# Patient Record
Sex: FEMALE | Race: WHITE | NOT HISPANIC OR LATINO | Employment: FULL TIME | URBAN - METROPOLITAN AREA
[De-identification: names, ages, dates, MRNs, and addresses within clinical notes are randomized per-mention and may not be internally consistent; named-entity substitution may affect disease eponyms.]

---

## 2018-09-06 ENCOUNTER — OFFICE VISIT - HEALTHEAST (OUTPATIENT)
Dept: FAMILY MEDICINE | Facility: CLINIC | Age: 28
End: 2018-09-06

## 2018-09-06 DIAGNOSIS — R50.9 FEVER: ICD-10-CM

## 2018-09-06 DIAGNOSIS — J02.9 PHARYNGITIS: ICD-10-CM

## 2018-09-06 LAB — DEPRECATED S PYO AG THROAT QL EIA: NORMAL

## 2018-09-07 LAB — GROUP A STREP BY PCR: NORMAL

## 2019-06-22 ENCOUNTER — OFFICE VISIT - HEALTHEAST (OUTPATIENT)
Dept: FAMILY MEDICINE | Facility: CLINIC | Age: 29
End: 2019-06-22

## 2019-06-22 DIAGNOSIS — J02.9 SORE THROAT: ICD-10-CM

## 2019-06-22 LAB — DEPRECATED S PYO AG THROAT QL EIA: NORMAL

## 2019-06-23 LAB — GROUP A STREP BY PCR: NORMAL

## 2020-12-23 ENCOUNTER — TELEPHONE (OUTPATIENT)
Dept: SLEEP MEDICINE | Facility: CLINIC | Age: 30
End: 2020-12-23

## 2020-12-23 NOTE — TELEPHONE ENCOUNTER
REFERRAL TO SLEEP RECEIVED.   ATTEMPTED TO REACH PATIENT TODAY 12/23/2020 TO SCHEDULE CONSULTATION WITH SLEEP PROVIDER. NO ANSWER. [facility]

## 2020-12-29 ENCOUNTER — VIRTUAL VISIT (OUTPATIENT)
Dept: SLEEP MEDICINE | Facility: CLINIC | Age: 30
End: 2020-12-29
Payer: COMMERCIAL

## 2020-12-29 DIAGNOSIS — R06.83 SNORING: ICD-10-CM

## 2020-12-29 DIAGNOSIS — G47.59 OTHER PARASOMNIA: ICD-10-CM

## 2020-12-29 DIAGNOSIS — G47.10 HYPERSOMNIA: ICD-10-CM

## 2020-12-29 DIAGNOSIS — R06.81 WITNESSED EPISODE OF APNEA: Primary | ICD-10-CM

## 2020-12-29 PROCEDURE — 99203 OFFICE O/P NEW LOW 30 MIN: CPT | Mod: GT | Performed by: INTERNAL MEDICINE

## 2020-12-29 RX ORDER — PAROXETINE 30 MG/1
TABLET, FILM COATED ORAL EVERY MORNING
COMMUNITY
End: 2022-09-07

## 2020-12-29 NOTE — NURSING NOTE
PSG and follow up scheduled. ESS and SALOME sent via Before the Call     Ewelina Delgado [facility] ~[place]
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
## 2020-12-29 NOTE — PATIENT INSTRUCTIONS
Your BMI is There is no height or weight on file to calculate BMI.  Weight management is a personal decision.  If you are interested in exploring weight loss strategies, the following discussion covers the approaches that may be successful. Body mass index (BMI) is one way to tell whether you are at a healthy weight, overweight, or obese. It measures your weight in relation to your height.  A BMI of 18.5 to 24.9 is in the healthy range. A person with a BMI of 25 to 29.9 is considered overweight, and someone with a BMI of 30 or greater is considered obese. More than two-thirds of American adults are considered overweight or obese.  Being overweight or obese increases the risk for further weight gain. Excess weight may lead to heart disease and diabetes.  Creating and following plans for healthy eating and physical activity may help you improve your health.  Weight control is part of healthy lifestyle and includes exercise, emotional health, and healthy eating habits. Careful eating habits lifelong are the mainstay of weight control. Though there are significant health benefits from weight loss, long-term weight loss with diet alone may be very difficult to achieve- studies show long-term success with dietary management in less than 10% of people. Attaining a healthy weight may be especially difficult to achieve in those with severe obesity. In some cases, medications, devices and surgical management might be considered.  What can you do?  If you are overweight or obese and are interested in methods for weight loss, you should discuss this with your provider.     Consider reducing daily calorie intake by 500 calories.     Keep a food journal.     Avoiding skipping meals, consider cutting portions instead.    Diet combined with exercise helps maintain muscle while optimizing fat loss. Strength training is particularly important for building and maintaining muscle mass. Exercise helps reduce stress, increase energy,  and improves fitness. Increasing exercise without diet control, however, may not burn enough calories to loose weight.       Start walking three days a week 10-20 minutes at a time    Work towards walking thirty minutes five days a week     Eventually, increase the speed of your walking for 1-2 minutes at time    In addition, we recommend that you review healthy lifestyles and methods for weight loss available through the National Institutes of Health patient information sites:  http://win.niddk.nih.gov/publications/index.htm    And look into health and wellness programs that may be available through your health insurance provider, employer, local community center, or nedra club.    Weight management plan: Patient was referred to their PCP to discuss a diet and exercise plan.  Patient education: What is a sleep study?     What is a sleep study? -- A sleep study is a test that measures how well you sleep and checks for sleep problems. For some sleep studies, you stay overnight in a sleep lab at a hospital or sleep center.     What happens during a sleep study? -- Before you go to sleep, a technician attaches small, sticky patches called  electrodes  to your head, chest, and legs. He or she will also place a small tube beneath your nose and might wrap 1 or 2 belts around your chest.   Each of these items has wires that connect to monitors. The monitors record your movement, brain activity, breathing, and other body functions while you sleep.  If you have a history of trouble falling asleep, your doctor might prescribe a medicine to help you fall asleep in the lab. If you have never taken the medicine before, your doctor might ask you take it on a night before your sleep study to see how it affects you.   Why might my doctor order a sleep study? -- Your doctor will order a sleep study if he or she thinks you have sleep apnea or a different condition that makes you:   ?Have sudden jerking leg movements while you sleep,  called  periodic limb movements.    ?Feel very sleepy during the day and fall asleep all of a sudden, called  narcolepsy.    ?Have trouble falling asleep or staying asleep over a long period of time, called  chronic insomnia.    ?Do odd things while you sleep, such as walking.  How should I prepare for a sleep study? -- On the day of your sleep study, you should:   ?Avoid alcohol   ?Avoid drinking coffee, tea, sodas, and other drinks that have caffeine in the afternoon and evening   ?Take all of your regular medicines     The cost of care estimate line is [phone]. They are able to give the patient an estimate of the charges and also an estimate of their insurance coverage/patient responsibility.   After your sleep study is performed, please call us at [phone] or [phone]  to schedule for a follow up to review the results of the sleep study.    Please bring one tab of low dose melatonin 3 mg or less to the night of the study.    Melatonin intake is completely voluntary.    You may take own melatonin after arrival to sleep center. Do not drive or operate machinery after intake of melatonin.

## 2021-01-15 ENCOUNTER — THERAPY VISIT (OUTPATIENT)
Dept: SLEEP MEDICINE | Facility: CLINIC | Age: 31
End: 2021-01-15
Payer: COMMERCIAL

## 2021-01-15 ENCOUNTER — HEALTH MAINTENANCE LETTER (OUTPATIENT)
Age: 31
End: 2021-01-15

## 2021-01-15 DIAGNOSIS — G47.10 HYPERSOMNIA: ICD-10-CM

## 2021-01-15 DIAGNOSIS — R06.81 WITNESSED EPISODE OF APNEA: ICD-10-CM

## 2021-01-15 DIAGNOSIS — R06.83 SNORING: ICD-10-CM

## 2021-01-15 DIAGNOSIS — G47.59 OTHER PARASOMNIA: ICD-10-CM

## 2021-01-15 PROCEDURE — 95810 POLYSOM 6/> YRS 4/> PARAM: CPT | Performed by: INTERNAL MEDICINE

## 2021-01-19 LAB — SLPCOMP: NORMAL

## 2021-01-29 ENCOUNTER — VIRTUAL VISIT (OUTPATIENT)
Dept: SLEEP MEDICINE | Facility: CLINIC | Age: 31
End: 2021-01-29
Payer: COMMERCIAL

## 2021-01-29 VITALS — BODY MASS INDEX: 34.36 KG/M2 | WEIGHT: 175 LBS | HEIGHT: 60 IN

## 2021-01-29 DIAGNOSIS — G47.61 PERIODIC LIMB MOVEMENT: ICD-10-CM

## 2021-01-29 DIAGNOSIS — G47.8 UPPER AIRWAY RESISTANCE SYNDROME: ICD-10-CM

## 2021-01-29 DIAGNOSIS — G47.59 OTHER PARASOMNIA: Primary | ICD-10-CM

## 2021-01-29 PROCEDURE — 99212 OFFICE O/P EST SF 10 MIN: CPT | Mod: TEL | Performed by: INTERNAL MEDICINE

## 2021-01-29 ASSESSMENT — MIFFLIN-ST. JEOR: SCORE: 1445.29

## 2021-01-29 NOTE — PATIENT INSTRUCTIONS
Your BMI is Body mass index is 34.18 kg/m .  Weight management is a personal decision.  If you are interested in exploring weight loss strategies, the following discussion covers the approaches that may be successful. Body mass index (BMI) is one way to tell whether you are at a healthy weight, overweight, or obese. It measures your weight in relation to your height.  A BMI of 18.5 to 24.9 is in the healthy range. A person with a BMI of 25 to 29.9 is considered overweight, and someone with a BMI of 30 or greater is considered obese. More than two-thirds of American adults are considered overweight or obese.  Being overweight or obese increases the risk for further weight gain. Excess weight may lead to heart disease and diabetes.  Creating and following plans for healthy eating and physical activity may help you improve your health.  Weight control is part of healthy lifestyle and includes exercise, emotional health, and healthy eating habits. Careful eating habits lifelong are the mainstay of weight control. Though there are significant health benefits from weight loss, long-term weight loss with diet alone may be very difficult to achieve- studies show long-term success with dietary management in less than 10% of people. Attaining a healthy weight may be especially difficult to achieve in those with severe obesity. In some cases, medications, devices and surgical management might be considered.  What can you do?  If you are overweight or obese and are interested in methods for weight loss, you should discuss this with your provider.     Consider reducing daily calorie intake by 500 calories.     Keep a food journal.     Avoiding skipping meals, consider cutting portions instead.    Diet combined with exercise helps maintain muscle while optimizing fat loss. Strength training is particularly important for building and maintaining muscle mass. Exercise helps reduce stress, increase energy, and improves fitness.  Increasing exercise without diet control, however, may not burn enough calories to loose weight.       Start walking three days a week 10-20 minutes at a time    Work towards walking thirty minutes five days a week     Eventually, increase the speed of your walking for 1-2 minutes at time    In addition, we recommend that you review healthy lifestyles and methods for weight loss available through the National Institutes of Health patient information sites:  http://win.niddk.nih.gov/publications/index.htm    And look into health and wellness programs that may be available through your health insurance provider, employer, local community center, or nedra club.    Weight management plan: Patient was referred to their PCP to discuss a diet and exercise plan.  Upper Airway Resistance Syndrome treatment options:    1. Positional therapy- consider getting bricks to put underneath the head of your bed to turn your bed into a wedge. Your aim is to elevated the plane of your bed so you are sleeping with the head of your bed elevated.  2. Muscle strengthening exercises- Consider learning to play a lower octave woodwind instrument such as tuba, saxophone or trumpet for 30 minutes a day for 3 months.  3.Weight loss  4.Mandibular advancement device  5.CPAP    Please call  to evaluate for Epilepsy.    Jam Martin MD, PhD  Epileptologist, Neurologist   Specialties and Services  Neurology     Neurology Clinic   [facility]    Floor 3  [address] [place] [address]   Appointments:   [phone]     [facility]    Floor 2, [address]  [address]  [address]  [place] [address]   Appointments:   [phone]        Statement Selected

## 2021-01-29 NOTE — PROGRESS NOTES
"Lina is a 30 year old who is being evaluated via a billable video visit.      How would you like to obtain your AVS? Mail a copy  If the video visit is dropped, the invitation should be resent by: Text to cell phone: [phone]  Will anyone else be joining your video visit? No   Emili Nova, CMA    The patient has been notified of following:      \"This telephone visit will be conducted via a call between you and your physician/provider. We have found that certain health care needs can be provided without the need for a physical exam.  This service lets us provide the care you need with a short phone conversation.  If a prescription is necessary we can send it directly to your pharmacy.  If lab work is needed we can place an order for that and you can then stop by our lab to have the test done at a later time.     Telephone visits are billed at different rates depending on your insurance coverage. During this emergency period, for some insurers they may be billed the same as an in-person visit.  Please reach out to your insurance provider with any questions.     If during the course of the call the physician/provider feels a telephone visit is not appropriate, you will not be charged for this service.\"     Patient has given verbal consent to a Telephone visit? Yes     I spent a total of 21 minutes of  phone encounter and in preparation for this clinic visit.    Video was not available for this visit.    Thank you for the opportunity to participate in the care of Lina Turcios.     She is a 30 year old  female patient who comes to the sleep medicine clinic for review of sleep study results. The study was completed on 01/15/21  which showed that the patient had Upper Airway Resistance syndrome, Periodic Limb movement in sleep and possible eliptiform activities on EEG. There was insufficient amount of stage REM sleep to rule out RBD.    Assessment and Plan:  In summary Lina Turcios is a 30 year old year " old female here for review of sleep study results.    1. Other parasomnia  Due to the fact that there was some possible eliptiform activities noted on EEG, I will refer the patient to be evaluated by an Epileptologist. I recommend that the patient continue to safe proof her sleeping environment and consider sleeping with the sleeping bag zipped up.  - NEUROLOGY ADULT REFERRAL    2. Upper airway resistance syndrome  We discussed the underlying pathophysiology of UARS. We also discussed the treatment options available including positional therapy, weight loss, muscle strengthening exercises, oral appliance and CPAP.    3. Periodic limb movement  Most likely will resolve after optimal therapy of UARS.    There is no problem list on file for this patient.      Current Outpatient Medications   Medication Sig Dispense Refill     PARoxetine (PAXIL) 30 MG tablet Take by mouth every morning         Labs/Studies:  - We reviewed the results of the overnight study as described on the HPI.       Patient verbalized understanding of these issues, agrees with the plan and all questions were answered today. Patient was given an opportuntity to voice any other symptoms or concerns not listed above. Patient did not have any other symptoms or concerns.      Carlos Kwan DO  Board Certified in Internal Medicine and Sleep Medicine    (Note created with Dragon voice recognition and unintended spelling errors and word substitutions may occur)

## 2021-02-08 ENCOUNTER — TELEPHONE (OUTPATIENT)
Dept: SLEEP MEDICINE | Facility: CLINIC | Age: 31
End: 2021-02-08

## 2021-02-08 DIAGNOSIS — G47.10 HYPERSOMNIA: ICD-10-CM

## 2021-02-08 DIAGNOSIS — G47.8 UPPER AIRWAY RESISTANCE SYNDROME: Primary | ICD-10-CM

## 2021-02-08 NOTE — TELEPHONE ENCOUNTER
Reason for call:  Other   Patient called regarding (reason for call): pt talked with insurance and they will cover cpap machine. Pt would like to get a cpap prescription and get the process started.    Additional comments: pt would like a callback regarding this matter please     Phone number to reach patient:  Cell number on file:    Telephone Information:   Mobile [phone]       Best Time:  Anytime     Can we leave a detailed message on this number?  YES    Travel screening: Not Applicable

## 2021-02-15 NOTE — TELEPHONE ENCOUNTER
Order for Durable Medical Equipment was processed and equipment ordered.     DME provider:  [facility]    Date Faxed: 2/15/2021    Ordering Provider: Carlos Kwan DO    PAP Order Type: NEW DEVICE ORDER    Fax Number: [phone]

## 2021-03-03 ENCOUNTER — DOCUMENTATION ONLY (OUTPATIENT)
Dept: SLEEP MEDICINE | Facility: CLINIC | Age: 31
End: 2021-03-03

## 2021-03-03 NOTE — PROGRESS NOTES
Patient was offered choice of vendor and chose [facility].  Patient Lina Turcios was set up at [place]  on March 3, 2021. Patient received a Resmed AirSense 10 Auto. Pressures were set at 5-15 cm H2O.   Patient s ramp is 4 cm H2O for Auto and FLEX/EPR is EPR, 2.  Patient received a New Respironics Mask name: Dreamwear Nasal mask size Standard, Fitpack, heated tubing and heated humidifier.  Patient does not need to meet compliance. Patient has a follow up not required with Dr. Kwan.    Tracy L Fahrenkamp

## 2021-03-08 ENCOUNTER — DOCUMENTATION ONLY (OUTPATIENT)
Dept: SLEEP MEDICINE | Facility: CLINIC | Age: 31
End: 2021-03-08
Payer: COMMERCIAL

## 2021-03-08 NOTE — PROGRESS NOTES
3 DAY STM VISIT    Diagnostic AHI: 3.9  PSG    Patient contacted for 3 day STM visit.    Confirmed with patient at time of call- N/A Patient is still interested in STM service     Message left for patient to return call.    Replacement device: No  STM ordered by provider: Yes     Device type: Auto-CPAP  PAP settings from order::  CPAP min 5 cm  H20       CPAP max 15 cm  H20  Mask type:    Nasal Mask     Device settings from machine      Min CPAP 5.0            Max CPAP 15.0          EPR level Setting: TWO      RESMED Soft response setting:  OFF  Assessment: Nightly usage over four hours.  Action plan: Patient to have 14 day STM visit. Patient has a follow up visit scheduled:   no.     Total time spent on accessing and  interpreting remote patient PAP therapy data  10 minutes  Total time spent counseling, coaching  and reviewing PAP therapy data with patient  1 minutes  [id number] no

## 2021-03-18 ENCOUNTER — DOCUMENTATION ONLY (OUTPATIENT)
Dept: SLEEP MEDICINE | Facility: CLINIC | Age: 31
End: 2021-03-18
Payer: COMMERCIAL

## 2021-03-18 NOTE — PROGRESS NOTES
14  DAY STM VISIT    Diagnostic AHI: 3.9  PSG    Unable to leave message mailbox is full.     Assessment: Pt meeting objective benchmarks.       Action plan: waiting for patient to return call.  and pt to have 30 day STM visit.      Device type: Auto-CPAP    PAP settings: CPAP min 5.0 cm  H20       CPAP max 15.0 cm  H20    95th% pressure 9.5 cm  H20        RESMED EPR level Setting: TWO    RESMED Soft response setting:  OFF    Mask type:  Nasal Mask    Objective measures: 14 day rolling measures      Compliance  100 %      Leak  11.28  lpm  last  upload      AHI 3.63   last  upload      Average number of minutes 469      Objective measure goal  Compliance   Goal >70%  Leak   Goal < 24 lpm  AHI  Goal < 5  Usage  Goal >240        Total time spent on accessing and interpreting remote patient PAP therapy data  10 minutes    Total time spent counseling, coaching  and reviewing PAP therapy data with patient  1 minutes    [id number]  [id number]  no (3 day STM)

## 2021-04-06 ENCOUNTER — DOCUMENTATION ONLY (OUTPATIENT)
Dept: SLEEP MEDICINE | Facility: CLINIC | Age: 31
End: 2021-04-06

## 2021-04-06 NOTE — PROGRESS NOTES
30 DAY STM VISIT    Diagnostic AHI: 3.9  PSG    Subjective measures:   Patient still very tired throughout the day.  Sometimes her mask comes off at night.  Patient to see a specialist for possible epilepsy going to schedule with Dr Tolentino for sleep follow up.      Assessment: Pt meeting objective benchmarks.  Patient failing following subjective benchmarks: not feeling benefit from therapy  Action plan: pt to have 6 month STM visit  Patient has not scheduled a follow up visit.   Device type: Auto-CPAP  PAP settings: CPAP min 5.0 cm  H20     CPAP max 15.0 cm  H20    95th% pressure 9.1 cm  H20      RESMED EPR level Setting: TWO    RESMED Soft response setting:  OFF  Mask type:  Nasal Mask  Objective measures: 14 day rolling measures      Compliance  85 %      Leak  16.88 lpm  last  upload      AHI 3.75   last  upload      Average number of minutes 375      Objective measure goal  Compliance   Goal >70%  Leak   Goal < 24 lpm  AHI  Goal < 5  Usage  Goal >240        Total time spent on accessing and interpreting remote patient PAP therapy data  10 minutes    Total time spent counseling, coaching  and reviewing PAP therapy data with patient  7 minutes     [id number] this call  [id number] no  at 3 or 14 day [facility]

## 2021-04-20 ENCOUNTER — COMMUNICATION - HEALTHEAST (OUTPATIENT)
Dept: SCHEDULING | Facility: CLINIC | Age: 31
End: 2021-04-20

## 2021-04-20 ENCOUNTER — TELEPHONE (OUTPATIENT)
Dept: SLEEP MEDICINE | Facility: CLINIC | Age: 31
End: 2021-04-20

## 2021-04-20 NOTE — TELEPHONE ENCOUNTER
Reason for Call:  Other call back    Detailed comments: Patient saw Dr. Kwan at [facility].  States was told that she would be referred to a Dr. Law and she doesn't have a telephone number or heard of anyone calling her?  States r/u Epilepsy.  Wondering if she can go to [facility]?  Please contact patient. Thank you.      Phone Number Patient can be reached at: Cell number on file:    Telephone Information:   Mobile [phone]       Best Time: anytime    Can we leave a detailed message on this number? YES    Call taken on 4/20/2021 at 1:37 PM by Angélica Gonzales

## 2021-04-23 NOTE — TELEPHONE ENCOUNTER
MICHELLE with Lina, stating to call [facility], left their phone number for her to call.  Also, explained I was sending her a [employer] message with her AVS from Dr. Kwan visit 1/2021, with the referral attached.

## 2021-05-03 ENCOUNTER — VIRTUAL VISIT (OUTPATIENT)
Dept: NEUROLOGY | Facility: CLINIC | Age: 31
End: 2021-05-03
Attending: INTERNAL MEDICINE
Payer: COMMERCIAL

## 2021-05-03 DIAGNOSIS — R40.4 NONSPECIFIC PAROXYSMAL SPELL: Primary | ICD-10-CM

## 2021-05-03 NOTE — LETTER
5/3/2021       RE: Lina Turcios  : 1990   MRN: [id number]      Dear Colleague,    Thank you for referring your patient, Lina Turcios, to the [facility]PI[facility] at [facility]. Please see a copy of my visit note below.    Lina is a 30 year old who is being evaluated via a billable video visit.      How would you like to obtain your AVS? MyChart  If the video visit is dropped, the invitation should be resent by: Send to e-mail at: [email or web address]  Will anyone else be joining your video visit? No      Video was switched to phone call due to technical issues.    [facility] History and Physical       Patient:  Lina Turcios  :  1990   Age:  30 year old   Today's Virtual Visit:  5/3/2021    Referring Provider:    Carlos Kwan DO  [address] [place] [address]      History of Present Illness:    Ms. Lina Turcios is a 30 yr old right-handed woman who is referred for evaluation of possible epileptiform discharges on sleep study.   She had a sleep study 1/15/2021 which showed frequent PLMs and possible epileptiform discharges.   For couple years, she has been feeling not well-rested and also she acts out in sleep. She has woken up and punching and kicking her . She woke up panicking at times. She is a restless sleeper and toss and turns in sleep.  That happens 5-6 times a year. When her  wakes him up, she is confused for a couple min as to what happened, why he woke her up. He never reported she gurgules or make strange noises. before using C-PAP, she used to snore or wake up snorting.  Since C-PAP therapy she hasn't had any kicking/punching episodes since, but she suddenly wakes up panicking and checking on the baby to make sure she is breathing.   At work, when he is tired, she stares off to space for 15 sec, and she doesn't realize she is doing it, but her coworker  "has noticed it and asked \"Lina, what are you doing?\" or \"Lina, you did it again\".  She typically sighs before she goes into one, she is not aware of it.  She has noticed this 2-3 times in a day when they worked together.  They last a few seconds. These have been noted in the past 3-4 weeks. Her  hasn't noted spacing out or other types of seizures.   Sometimes has an involuntary twitch in her ankle or foot, noticed more in her left side. She has also noted it in her left elbow.   Franck, her , contributes to the history: it sounds like a panic attack. She wakes up and is concern about one of the kids. If her  moves, she startles, she wakes up and panic. Another thing is she kicks or flails her arm, and he wakes up. When he wakes up, it's over, he thinks it's only once, not continuous. No gurgling sounds or strange noises. No tongue bite, or urinary incontinence. Her  still notes these while using C-PAP. He is a sound-sleeper, can't tell the frequency, may be every 3-4 weeks.    Epilepsy Risk Factors:  Patient has been adopted at age 12. Her mom was an alcoholic, doesn't have detailed childhood history. Her brother is Dx'ed with fetal alcohol syndrome.  Her daughter was Dx'ed with rolandic epilepsy. She is not aware of having febrile seizures, meningitis or significant head injuries.   Past Medical History: Anxiety and depression-taking Paxil and sees a therapist. Upper airway Resistance syndrome (UARS).   Other issues: She has been getting a sharp shooting pain in her forehead for a few seconds when she sneezes in the past couple weeks, not with couphing or heavy lifting or bending her head.   Past Medical History:   Diagnosis Date     Depression      Family History   Adopted: Yes      Social History     Socioeconomic History     Marital status:      Spouse name: Not on file     Number of children: Not on file     Years of education: Not on file     Highest education level: " Not on file   Occupational History     Not on file   Social Needs     Financial resource strain: Not on file     Food insecurity     Worry: Not on file     Inability: Not on file     Transportation needs     Medical: Not on file     Non-medical: Not on file   Tobacco Use     Smoking status: Former Smoker     Smokeless tobacco: Current User   Substance and Sexual Activity     Alcohol use: Not on file     Drug use: Not on file     Sexual activity: Not on file   Lifestyle     Physical activity     Days per week: Not on file     Minutes per session: Not on file     Stress: Not on file   Relationships     Social connections     Talks on phone: Not on file     Gets together: Not on file     Attends [employer] service: Not on file     Active member of club or organization: Not on file     Attends meetings of clubs or organizations: Not on file     Relationship status: Not on file     Intimate partner violence     Fear of current or ex partner: Not on file     Emotionally abused: Not on file     Physically abused: Not on file     Forced sexual activity: Not on file   Other Topics Concern     Parent/sibling w/ CABG, MI or angioplasty before 65F 55M? 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 , has 2 children. She didn't have special ed during school yrs. She has a 2 yr degree in associates art. She drinks 3 drinks a week, wapes nicotine daily, denies illicit drugs.    Driving:  Currently patient is: driving  Female:   Birth control method: Copper IUD, not certain about having more kids.     Current Outpatient Medications   Medication Sig Dispense Refill     PARoxetine (PAXIL) 30 MG tablet Take by mouth every morning         Assessment and Plan:    1. Nocturnal spells: Differential diagnosis include periodic limb movements versus REM sleep behavior disorder versus epilepsy.    2. Daytime spacing out  spells: Epilepsy versus excessive daytime drowsiness versus psychogenic nonepileptic spells.    Discussion: [place] regulations on seizures and driving were reviewed with the patient.  The patient clearly understands that she/he is prohibited from operating a motor vehicle within 3 months following any seizure (that will impair control of car) or other episode with sudden unconsciousness or inability to sit up, and that she/he is required to report this most recent seizure to the DMV within 30 days after the event.    Avoid any activities that might lead to self-injury or injury of others, within 3 months following any seizure with impaired awareness or impaired motor control such activities include but are not limited to operating power tools, operating firearms, climbing ladders/trees/exposure to heights from which he might fall, exposure to vessels with hot cooking oil or water, and swimming alone.    I discussed doing a 3T brain MRI with epilepsy protocol and a 3-hour video EEG.  If EEG was not revealing we will admit the patient to epilepsy monitoring unit for a diagnostic evaluation and capturing her spells.     Will follow up after these tests.    As described above, I talked with the patient for 44 minutes and during this time counseling was greater than 50% of the visit time.  I spent an additional 20 minutes on reviewing patient's sleep study and documentation. This note was created in part by the use of Dragon voice recognition system. Inadvertent grammatical errors and typographical errors may still exist.  Tiffany Corcoran MD

## 2021-05-03 NOTE — PROGRESS NOTES
"Lina is a 30 year old who is being evaluated via a billable video visit.      How would you like to obtain your AVS? MyChart  If the video visit is dropped, the invitation should be resent by: Send to e-mail at: [email or web address]  Will anyone else be joining your video visit? No      Video was switched to phone call due to technical issues.    [facility] Physical       Patient:  Lina Turcios  :  1990   Age:  30 year old   Today's Virtual Visit:  5/3/2021    Referring Provider:    Carlos Kwan, DO  [address]  [place] [address]      History of Present Illness:    Ms. Lina Turcios is a 30 yr old right-handed woman who is referred for evaluation of possible epileptiform discharges on sleep study.   She had a sleep study 1/15/2021 which showed frequent PLMs and possible epileptiform discharges.   For couple years, she has been feeling not well-rested and also she acts out in sleep. She has woken up and punching and kicking her . She woke up panicking at times. She is a restless sleeper and toss and turns in sleep.  That happens 5-6 times a year. When her  wakes him up, she is confused for a couple min as to what happened, why he woke her up. He never reported she gurgules or make strange noises. before using C-PAP, she used to snore or wake up snorting.  Since C-PAP therapy she hasn't had any kicking/punching episodes since, but she suddenly wakes up panicking and checking on the baby to make sure she is breathing.   At work, when he is tired, she stares off to space for 15 sec, and she doesn't realize she is doing it, but her coworker has noticed it and asked \"Lina, what are you doing?\" or \"Lina, you did it again\".  She typically sighs before she goes into one, she is not aware of it.  She has noticed this 2-3 times in a day when they worked together.  They last a few seconds. These have been noted in the past " 3-4 weeks. Her  hasn't noted spacing out or other types of seizures.   Sometimes has an involuntary twitch in her ankle or foot, noticed more in her left side. She has also noted it in her left elbow.   Franck, her , contributes to the history: it sounds like a panic attack. She wakes up and is concern about one of the kids. If her  moves, she startles, she wakes up and panic. Another thing is she kicks or flails her arm, and he wakes up. When he wakes up, it's over, he thinks it's only once, not continuous. No gurgling sounds or strange noises. No tongue bite, or urinary incontinence. Her  still notes these while using C-PAP. He is a sound-sleeper, can't tell the frequency, may be every 3-4 weeks.    Epilepsy Risk Factors:  Patient has been adopted at age 12. Her mom was an alcoholic, doesn't have detailed childhood history. Her brother is Dx'ed with fetal alcohol syndrome.  Her daughter was Dx'ed with rolandic epilepsy. She is not aware of having febrile seizures, meningitis or significant head injuries.   Past Medical History: Anxiety and depression-taking Paxil and sees a therapist. Upper airway Resistance syndrome (UARS).   Other issues: She has been getting a sharp shooting pain in her forehead for a few seconds when she sneezes in the past couple weeks, not with couphing or heavy lifting or bending her head.   Past Medical History:   Diagnosis Date     Depression      Family History   Adopted: Yes      Social History     Socioeconomic History     Marital status:      Spouse name: Not on file     Number of children: Not on file     Years of education: Not on file     Highest education level: Not on file   Occupational History     Not on file   Social Needs     Financial resource strain: Not on file     Food insecurity     Worry: Not on file     Inability: Not on file     Transportation needs     Medical: Not on file     Non-medical: Not on file   Tobacco Use     Smoking  status: Former Smoker     Smokeless tobacco: Current User   Substance and Sexual Activity     Alcohol use: Not on file     Drug use: Not on file     Sexual activity: Not on file   Lifestyle     Physical activity     Days per week: Not on file     Minutes per session: Not on file     Stress: Not on file   Relationships     Social connections     Talks on phone: Not on file     Gets together: Not on file     Attends Alevism service: Not on file     Active member of club or organization: Not on file     Attends meetings of clubs or organizations: Not on file     Relationship status: Not on file     Intimate partner violence     Fear of current or ex partner: Not on file     Emotionally abused: Not on file     Physically abused: Not on file     Forced sexual activity: Not on file   Other Topics Concern     Parent/sibling w/ CABG, MI or angioplasty before 65F 55M? 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 , has 2 children. She didn't have special ed during school yrs. She has a 2 yr degree in [employer] art. She drinks 3 drinks a week, wapes nicotine daily, denies illicit drugs.    Driving:  Currently patient is: driving  Female:   Birth control method: Copper IUD, not certain about having more kids.     Current Outpatient Medications   Medication Sig Dispense Refill     PARoxetine (PAXIL) 30 MG tablet Take by mouth every morning         Assessment and Plan:    1. Nocturnal spells: Differential diagnosis include periodic limb movements versus REM sleep behavior disorder versus epilepsy.    2. Daytime spacing out spells: Epilepsy versus excessive daytime drowsiness versus psychogenic nonepileptic spells.    Discussion: [place] regulations on seizures and driving were reviewed with the patient.  The patient clearly understands that she/he is prohibited from operating a motor vehicle within 3  months following any seizure (that will impair control of car) or other episode with sudden unconsciousness or inability to sit up, and that she/he is required to report this most recent seizure to the DMV within 30 days after the event.    Avoid any activities that might lead to self-injury or injury of others, within 3 months following any seizure with impaired awareness or impaired motor control such activities include but are not limited to operating power tools, operating firearms, climbing ladders/trees/exposure to heights from which he might fall, exposure to vessels with hot cooking oil or water, and swimming alone.    I discussed doing a 3T brain MRI with epilepsy protocol and a 3-hour video EEG.  If EEG was not revealing we will admit the patient to epilepsy monitoring unit for a diagnostic evaluation and capturing her spells.     Will follow up after these tests.            As described above, I talked with the patient for 44 minutes and during this time counseling was greater than 50% of the visit time.  I spent an additional 20 minutes on reviewing patient's sleep study and documentation. This note was created in part by the use of Dragon voice recognition system. Inadvertent grammatical errors and typographical errors may still exist.  Tiffany Corcoran MD

## 2021-06-01 VITALS — WEIGHT: 162 LBS | BODY MASS INDEX: 31.64 KG/M2

## 2021-06-03 VITALS — WEIGHT: 166.7 LBS | BODY MASS INDEX: 32.56 KG/M2

## 2021-06-16 NOTE — TELEPHONE ENCOUNTER
Scheduling phoned [facility] to look up a visit from sleep center for MD Law.  No visit in chart.  Scheduling states that they will notify MD Kwan to get information for patient on how to contact MD Law.  [facility] disconnected from call.    COVID 19 Nurse Triage Plan/Patient Instructions    Please be aware that novel coronavirus (COVID-19) may be circulating in the community. If you develop symptoms such as fever, cough, or SOB or if you have concerns about the presence of another infection including coronavirus (COVID-19), please contact your health care provider or visit  [email or web address].    Disposition/Instructions    Home care recommended. Follow home care protocol based instructions.    Thank you for taking steps to prevent the spread of this virus.  o Limit your contact with others.  o Wear a simple mask to cover your cough.  o Wash your hands well and often.    Resources    [facility]: About COVID-19: [email or web address]    CDC: What to Do If You're Sick: www.cdc.gov/coronavirus/2019-ncov/about/steps-when-sick.html    CDC: Ending Home Isolation: www.cdc.gov/coronavirus/2019-ncov/hcp/disposition-in-home-patients.html     CDC: Caring for Someone: www.cdc.gov/coronavirus/2019-ncov/if-you-are-sick/care-for-someone.html     [facility]: Interim Guidance for Hospital Discharge to Home: www.health.[facility].[place]/diseases/coronavirus/hcp/hospdischarge.pdf    [facility] clinical trials (COVID-19 research studies): [email or web address].[facility]/n-clinical-trials     Below are the COVID-19 hotlines at the [place] Department of Health ([facility]). Interpreters are available.   o For health questions: Call 947-328-3945 or 1-672.161.1402 (7 a.m. to 7 p.m.)  o For questions about schools and childcare: Call 809-339-5905 or 1-473.233.3365 (7 a.m. to 7 p.m.)     Reason for Disposition    Information only question and nurse able to answer    Additional Information    Negative: Nursing judgment    Negative:  Nursing judgment    Negative: Nursing judgment    Negative: Nursing judgment    Protocols used: INFORMATION ONLY CALL - NO TRIAGE-A-[place]

## 2021-06-17 NOTE — PATIENT INSTRUCTIONS - HE
Patient Instructions by Jude Juarez PA-C at 6/22/2019  9:40 AM     Author: Jude Juarez PA-C Service: -- Author Type: Physician Assistant    Filed: 6/22/2019 10:46 AM Encounter Date: 6/22/2019 Status: Signed    : Jude Juarez PA-C (Physician Assistant)       Suggested increased rest increased fluids and bedside humidification  Over-the-counter Tylenol for comfort.  Follow packaging directions  Over-the-counter throat lozenges with benzocaine such as Cepacol may be used if indicated and is not a choking hazard based on age.  Follow packaging directions.  Do not overuse the benzocaine as it will dry the throat and make it uncomfortable.  Follow up with primary care provider if you do not get resolution with the course of treatment.  Return to walk-in care if complication or new symptoms arise in the interim.        Self-Care for Sore Throats  Sore throats happen for many reasons, such as colds, allergies, and infections caused by viruses or bacteria. In any case, your throat becomes red and sore. Your goal for self-care is to reduce your discomfort while giving your throat a chance to heal.    Moisten and soothe your throat  Tips include the following:    Try a sip of water first thing after waking up.    Keep your throat moist by drinking 6 or more glasses of clear liquids every day.    Run a cool-air humidifier in your room overnight.    Avoid cigarette smoke.     Suck on throat lozenges, cough drops, hard candy, ice chips, or frozen fruit-juice bars. Use the sugar-free versions if your diet or medical condition requires them.  Gargle to ease irritation  Gargling every hour or 2 can ease irritation. Try gargling with 1 of these solutions:    1/4 teaspoon of salt in 1/2 cup of warm water    An over-the-counter anesthetic gargle  Use medicine for more relief  Over-the-counter medicine can reduce sore throat symptoms. Ask your pharmacist if you have questions about which medicine to use:    Ease pain with  anesthetic sprays. Aspirin or an aspirin substitute also helps. Remember, never give aspirin to anyone 18 or younger, or if you are already taking blood thinners.     For sore throats caused by allergies, try antihistamines to block the allergic reaction.    Remember: unless a sore throat is caused by a bacterial infection, antibiotics wont help you.  Prevent future sore throats  Prevention tips include the following:    Stop smoking or reduce contact with secondhand smoke. Smoke irritates the tender throat lining.    Limit contact with pets and with allergy-causing substances, such as pollen and mold.    When youre around someone with a sore throat or cold, wash your hands often to keep viruses or bacteria from spreading.    Dont strain your vocal cords.  Call your healthcare provider  Contact your healthcare provider if you have:    A temperature over 101 F (38.3 C)    White spots on the throat    Great difficulty swallowing    Trouble breathing    A skin rash    Recent exposure to someone else with strep bacteria    Severe hoarseness and swollen glands in the neck or jaw   Date Last Reviewed: 8/1/2016 2000-2016 [employer]. [address] [place] [address], [place] [address]. All rights reserved. This information is not intended as a substitute for professional medical care. Always follow your healthcare professional's instructions.

## 2021-06-20 NOTE — PROGRESS NOTES
Assessment:     1. Fever  Rapid Strep A Screen-Throat swab    Group A Strep, RNA Direct Detection, Throat   2. Pharyngitis       Results for orders placed or performed in visit on 09/06/18   Rapid Strep A Screen-Throat swab   Result Value Ref Range    Rapid Strep A Antigen No Group A Strep detected, presumptive negative No Group A Strep detected, presumptive negative   Group A Strep, RNA Direct Detection, Throat   Result Value Ref Range    Group A Strep by PCR No Group A Strep rRNA detected No Group A Strep rRNA detected            Plan:     Differential diagnosis include but not limited to fever, pharyngitis, strep infection.  Discussed with the patient about negative strep results.  At this time we will treat this with supportive care including increase fluid intake, may take ibuprofen or Tylenol for pain or fever.  Monitor for worsening symptoms.  Follow-up with a PCP if symptoms does not resolve in 3-5 days.    Subjective:       27 y.o. female presents for evaluation of possible strep infection.  The patient is here with her-2 children 1 has tested positive for strep.  She reports that she spiked a fever yesterday.  Denies any other symptoms including nausea, vomiting, diarrhea, sore throat, shortness of breath.  She was exposed to someone with strep infection over Labor Day weekend.    The following portions of the patient's history were reviewed and updated as appropriate: allergies, current medications, past family history, past medical history, past social history, past surgical history and problem list.    Review of Systems  A 12 point comprehensive review of systems was negative except as noted.     Objective:      /65  Pulse 67  Temp 97  F (36.1  C) (Oral)   Resp 18  Wt 162 lb (73.5 kg)  SpO2 99%  BMI 31.64 kg/m2  General appearance: alert, appears stated age, cooperative and moderate distress  Head: Normocephalic, without obvious abnormality, atraumatic, sinuses nontender to percussion  Eyes:  conjunctivae/corneas clear. PERRL, EOM's intact. Fundi benign.  Ears: normal TM's and external ear canals both ears  Nose: Nares normal. Septum midline. Mucosa normal. No drainage or sinus tenderness.  Back: symmetric, no curvature. ROM normal. No CVA tenderness.  Lungs: clear to auscultation bilaterally  Heart: regular rate and rhythm, S1, S2 normal, no murmur, click, rub or gallop  Extremities: extremities normal, atraumatic, no cyanosis or edema  Pulses: 2+ and symmetric  Skin: Skin color, texture, turgor normal. No rashes or lesions  Lymph nodes: Cervical, supraclavicular, and axillary nodes normal.  Neurologic: Grossly normal     This note has been dictated using voice recognition software. Any grammatical or context distortions are unintentional and inherent to the software

## 2021-06-27 NOTE — PROGRESS NOTES
Progress Notes by Jude Juarez PA-C at 2019  9:40 AM     Author: Jude Juarez PA-C Service: -- Author Type: Physician Assistant    Filed: 2019  3:49 PM Encounter Date: 2019 Status: Signed    : Jude Juarez PA-C (Physician Assistant)       Subjective:      Patient ID: Lina Turcios is a 28 y.o. female.    Chief Complaint:    HPI  Lina Turcios is a 28 y.o. female who currently 9-1/2 weeks pregnant with a estimated due date of 2019 who presents today complaining of concern for exposure to strep throat.  She is bringing her children who are symptomatic and they have been exposed to a grandparent who has had strep throat.      Currently the patient is asymptomatic at this time with no sore throat or odynophagia.  Patient denies fever, chills, night sweats, fatigue, skin rash, abdominal pain or urinary symptoms.  She has had some vomiting or diarrhea but this may be related to her pregnancy.  No new or problematic changes.    Has not tried treatment for this over-the-counter.      No past medical history on file.    Past Surgical History:   Procedure Laterality Date   ?  SECTION, CLASSIC         Family History   Adopted: Yes   Problem Relation Age of Onset   ? No Medical Problems Mother    ? No Medical Problems Father        Social History     Tobacco Use   ? Smoking status: Never Smoker   ? Smokeless tobacco: Never Used   Substance Use Topics   ? Alcohol use: Yes     Alcohol/week: 1.8 oz     Types: 3 Cans of beer per week   ? Drug use: No       Review of Systems  As above in HPI, otherwise balance of Review of Systems are negative.    Objective:     /72 (Patient Site: Right Arm, Patient Position: Sitting, Cuff Size: Adult Regular)   Pulse 88   Temp 98.7  F (37.1  C) (Oral)   Wt 166 lb 11.2 oz (75.6 kg)   SpO2 97%   BMI 32.56 kg/m      Physical Exam  General: Patient is resting comfortably no acute distress is afebrile  HEENT: Head is normocephalic atraumatic   eyes  are PERRL EOMI sclera anicteric   TMs are clear bilaterally  Throat is clear  No cervical lymphadenopathy present  LUNGS: Clear to auscultation bilaterally  HEART: Regular rate and rhythm  Skin: Without rash non-diaphoretic    Lab:  Results for orders placed or performed in visit on 06/22/19   Rapid Strep A Screen-Throat   Result Value Ref Range    Rapid Strep A Antigen No Group A Strep detected, presumptive negative No Group A Strep detected, presumptive negative                     Assessment:     Procedures    The encounter diagnosis was Sore throat.    Plan:     1. Sore throat  Rapid Strep A Screen-Throat    Group A Strep, RNA Direct Detection, Throat       Had a conversation that she will monitor her symptoms and will return to clinic if she becomes symptomatic or if new symptoms or concerns arise.  Shins were answered to patient's satisfaction before discharge.    Patient Instructions     Suggested increased rest increased fluids and bedside humidification  Over-the-counter Tylenol for comfort.  Follow packaging directions  Over-the-counter throat lozenges with benzocaine such as Cepacol may be used if indicated and is not a choking hazard based on age.  Follow packaging directions.  Do not overuse the benzocaine as it will dry the throat and make it uncomfortable.  Follow up with primary care provider if you do not get resolution with the course of treatment.  Return to walk-in care if complication or new symptoms arise in the interim.        Self-Care for Sore Throats  Sore throats happen for many reasons, such as colds, allergies, and infections caused by viruses or bacteria. In any case, your throat becomes red and sore. Your goal for self-care is to reduce your discomfort while giving your throat a chance to heal.    Moisten and soothe your throat  Tips include the following:    Try a sip of water first thing after waking up.    Keep your throat moist by drinking 6 or more glasses of clear liquids every  day.    Run a cool-air humidifier in your room overnight.    Avoid cigarette smoke.     Suck on throat lozenges, cough drops, hard candy, ice chips, or frozen fruit-juice bars. Use the sugar-free versions if your diet or medical condition requires them.  Gargle to ease irritation  Gargling every hour or 2 can ease irritation. Try gargling with 1 of these solutions:    1/4 teaspoon of salt in 1/2 cup of warm water    An over-the-counter anesthetic gargle  Use medicine for more relief  Over-the-counter medicine can reduce sore throat symptoms. Ask your pharmacist if you have questions about which medicine to use:    Ease pain with anesthetic sprays. Aspirin or an aspirin substitute also helps. Remember, never give aspirin to anyone 18 or younger, or if you are already taking blood thinners.     For sore throats caused by allergies, try antihistamines to block the allergic reaction.    Remember: unless a sore throat is caused by a bacterial infection, antibiotics wont help you.  Prevent future sore throats  Prevention tips include the following:    Stop smoking or reduce contact with secondhand smoke. Smoke irritates the tender throat lining.    Limit contact with pets and with allergy-causing substances, such as pollen and mold.    When youre around someone with a sore throat or cold, wash your hands often to keep viruses or bacteria from spreading.    Dont strain your vocal cords.  Call your healthcare provider  Contact your healthcare provider if you have:    A temperature over 101 F (38.3 C)    White spots on the throat    Great difficulty swallowing    Trouble breathing    A skin rash    Recent exposure to someone else with strep bacteria    Severe hoarseness and swollen glands in the neck or jaw   Date Last Reviewed: 8/1/2016 2000-2016 [employer]. [address] [place] [address], [place] [address]. All rights reserved. This information is not intended as a substitute for professional medical care. Always  follow your healthcare professional's instructions.

## 2021-09-21 ENCOUNTER — TELEPHONE (OUTPATIENT)
Dept: NEUROLOGY | Facility: CLINIC | Age: 31
End: 2021-09-21

## 2021-09-21 NOTE — TELEPHONE ENCOUNTER
What is the concern that needs to be addressed by a nurse? I was helping patient set up her MRI but due to Copper IUD Far[facility] can not do 3T epilepsy Protocol w/neuroquant. Patient did say she was open to the idea of having it removed and then replaced but wanted to check if there was any other type of MRI Dr. Henderson would want ordered instead?    May a detailed message be left on voicemail? Yes    Date of last office visit: 5/3/21    Message routed to: Radha Bagley

## 2021-09-22 NOTE — TELEPHONE ENCOUNTER
Per Dr. Henderson, patient should have IUD removed for MRI. Patient was notified and agreeable. She may use nexplanon which should be MRI safe. Dr. Henderson would like an appt scheduled for shortly after the EEG and MRI. Scheduling was notified.

## 2021-10-03 ENCOUNTER — HEALTH MAINTENANCE LETTER (OUTPATIENT)
Age: 31
End: 2021-10-03

## 2021-10-05 ENCOUNTER — ANCILLARY PROCEDURE (OUTPATIENT)
Dept: NEUROLOGY | Facility: CLINIC | Age: 31
End: 2021-10-05
Attending: PSYCHIATRY & NEUROLOGY
Payer: COMMERCIAL

## 2021-10-05 DIAGNOSIS — R40.4 NONSPECIFIC PAROXYSMAL SPELL: ICD-10-CM

## 2021-10-18 ENCOUNTER — TRANSFERRED RECORDS (OUTPATIENT)
Dept: HEALTH INFORMATION MANAGEMENT | Facility: CLINIC | Age: 31
End: 2021-10-18

## 2021-10-21 ENCOUNTER — VIRTUAL VISIT (OUTPATIENT)
Dept: NEUROLOGY | Facility: CLINIC | Age: 31
End: 2021-10-21
Payer: COMMERCIAL

## 2021-10-21 DIAGNOSIS — R40.4 NONSPECIFIC PAROXYSMAL SPELL: Primary | ICD-10-CM

## 2021-10-21 NOTE — PROGRESS NOTES
"Lina is a 31 year old who is being evaluated via a billable video visit.      How would you like to obtain your AVS? MyChart and mail  If the video visit is dropped, the invitation should be resent by: Send to e-mail at: [email or web address], last resort call.  Will anyone else be joining your video visit?     Video Start Time: 10:27  Video-Visit Details    Type of service:  Video Visit    Video End Time:10:47    Originating Location (pt. Location): Home    Distant Location (provider location):  [email or web address]     Platform used for Video Visit: [email or web address] Progress Note      Patient:  Lina Turcios  :  1990   Age:  31 year old   Today's Virtual Visit:  10/21/2021    History of Present Illness:     Lina is participating in this virtual visit for follow-up of her spells.  She continues having staring spells and episodes that wake her up from sleep.  She also states she has twitches in arms or fingers or ankles, left greater than right, happen all the time, especially before going to bed.    Previously she described her daytime spells typically were noted at work, especially when she is tired, \"she stares off to space for 15 sec, and she doesn't realize she is doing it, but her coworker has noticed it and asked \"Lina, what are you doing?\" or \"Lina, you did it again\".  She typically sighs before she goes into one, she is not aware of it.  She has noticed this 2-3 times in a day when they worked together.\"      I read her 3 hr vEEG 10/5/2021 which was a normal awake and asleep EEG. Photic stimulation and HV did not induce an abnormality. Lina states she had several twitches during EEG but did not push the event button.   She had a brain MRI 10/18/2021 at [facility] ([facility] which was normal.    Uses C-PAP which helps some with sleep, but not with her spells.    Current Outpatient Medications   Medication Sig Dispense Refill     PARoxetine (PAXIL) 30 " MG tablet Take by mouth every morning        Assessment and Plan:    1. Nocturnal spells: Differential diagnosis include periodic limb movements versus REM sleep behavior disorder versus epilepsy.      2. Daytime spacing out spells: Epilepsy versus excessive daytime drowsiness versus psychogenic nonepileptic spells.    The patient had a 3-hour video EEG and brain MRI which were normal.  She states she has frequent extremity twitches, more prevalent on the left.  She had these during EEG and did not have an abnormal EEG correlate.  She did not have any of her staring spells or episodes during sleep.  I discussed that the normal EEG does not rule out epilepsy and to confirm diagnosis we need to capture her spells on the EEG.  I suggested to do an admission to epilepsy monitoring unit to record her spells for characterization.  She would like to do an ambulatory EEG.     -Ambulatory EEG-3 or 4 days  -Follow-up after EEG        As described above, I met with the patient for 20 minutes and during this time counseling was greater than 50% of the visit time.This note was created in part by the use of Dragon voice recognition system. Inadvertent grammatical errors and typographical errors may still exist.  Tiffany Corcoran MD

## 2021-10-21 NOTE — PROGRESS NOTES
Called patient.  Left voicemail message to call back to complete rooming process.  Jie Haddad, [facility]

## 2021-10-21 NOTE — LETTER
"10/21/2021       RE: Lina Turcios  : 1990   MRN: [id number]      Dear Colleague,    Thank you for referring your patient, Lina Turcios, to the [facility]PI[facility] at [facility]. Please see a copy of my visit note below.    Called patient.  Left voicemail message to call back to complete rooming process.  Jie Haddad, CMA      Called pt mobile and left message to call back for rooming.    Aleta Machado, EMT      Lina is a 31 year old who is being evaluated via a billable video visit.      How would you like to obtain your AVS? MyChart and mail  If the video visit is dropped, the invitation should be resent by: Send to e-mail at: [email or web address], last resort call.  Will anyone else be joining your video visit?     Video Start Time: 10:27  Video-Visit Details    Type of service:  Video Visit    Video End Time:10:47    Originating Location (pt. Location): Home    Distant Location (provider location):  [facility]PILE[facility]     Platform used for Video Visit: [facility] Ep[facility]psy [facility] Progress Note      Patient:  Lina Turcios  :  1990   Age:  31 year old   Today's Virtual Visit:  10/21/2021    History of Present Illness:     Lina is participating in this virtual visit for follow-up of her spells.  She continues having staring spells and episodes that wake her up from sleep.  She also states she has twitches in arms or fingers or ankles, left greater than right, happen all the time, especially before going to bed.    Previously she described her daytime spells typically were noted at work, especially when she is tired, \"she stares off to space for 15 sec, and she doesn't realize she is doing it, but her coworker has noticed it and asked \"Lina, what are you doing?\" or \"Lina, you did it again\".  She typically sighs before she goes into one, she is not aware of it.  She has noticed this 2-3 " "times in a day when they worked together.\"      I read her 3 hr vEEG 10/5/2021 which was a normal awake and asleep EEG. Photic stimulation and HV did not induce an abnormality. Lina states she had several twitches during EEG but did not push the event button.   She had a brain MRI 10/18/2021 at [facility] ([facility]) which was normal.    Uses C-PAP which helps some with sleep, but not with her spells.    Current Outpatient Medications   Medication Sig Dispense Refill     PARoxetine (PAXIL) 30 MG tablet Take by mouth every morning        Assessment and Plan:    1. Nocturnal spells: Differential diagnosis include periodic limb movements versus REM sleep behavior disorder versus epilepsy.      2. Daytime spacing out spells: Epilepsy versus excessive daytime drowsiness versus psychogenic nonepileptic spells.    The patient had a 3-hour video EEG and brain MRI which were normal.  She states she has frequent extremity twitches, more prevalent on the left.  She had these during EEG and did not have an abnormal EEG correlate.  She did not have any of her staring spells or episodes during sleep.  I discussed that the normal EEG does not rule out epilepsy and to confirm diagnosis we need to capture her spells on the EEG.  I suggested to do an admission to epilepsy monitoring unit to record her spells for characterization.  She would like to do an ambulatory EEG.     -Ambulatory EEG-3 or 4 days  -Follow-up after EEG        As described above, I met with the patient for 20 minutes and during this time counseling was greater than 50% of the visit time.This note was created in part by the use of Dragon voice recognition system. Inadvertent grammatical errors and typographical errors may still exist.  Tiffany Corcoran MD                            Again, thank you for allowing me to participate in the care of your patient.      Sincerely,    Tiffany Corcoran MD      "

## 2021-10-22 ENCOUNTER — TELEPHONE (OUTPATIENT)
Dept: NEUROLOGY | Facility: CLINIC | Age: 31
End: 2021-10-22

## 2021-10-22 NOTE — TELEPHONE ENCOUNTER
M for check out to schedule return appt w Dr. Henderson, follow up after ambulatory eeg (also needs scheduling), provided clinic number for call back.

## 2022-01-23 ENCOUNTER — HEALTH MAINTENANCE LETTER (OUTPATIENT)
Age: 32
End: 2022-01-23

## 2022-03-19 ENCOUNTER — OFFICE VISIT (OUTPATIENT)
Dept: FAMILY MEDICINE | Facility: CLINIC | Age: 32
End: 2022-03-19
Payer: COMMERCIAL

## 2022-03-19 VITALS
TEMPERATURE: 97.7 F | SYSTOLIC BLOOD PRESSURE: 107 MMHG | HEART RATE: 79 BPM | BODY MASS INDEX: 34.96 KG/M2 | OXYGEN SATURATION: 98 % | WEIGHT: 179 LBS | DIASTOLIC BLOOD PRESSURE: 76 MMHG | RESPIRATION RATE: 18 BRPM

## 2022-03-19 DIAGNOSIS — M54.6 ACUTE LEFT-SIDED THORACIC BACK PAIN: Primary | ICD-10-CM

## 2022-03-19 PROCEDURE — 99203 OFFICE O/P NEW LOW 30 MIN: CPT | Performed by: STUDENT IN AN ORGANIZED HEALTH CARE EDUCATION/TRAINING PROGRAM

## 2022-03-19 RX ORDER — IBUPROFEN 200 MG
1000 TABLET ORAL EVERY 4 HOURS PRN
COMMUNITY
End: 2022-09-07

## 2022-03-19 RX ORDER — IBUPROFEN 800 MG/1
800 TABLET, FILM COATED ORAL EVERY 8 HOURS PRN
Qty: 42 TABLET | Refills: 0 | Status: SHIPPED | OUTPATIENT
Start: 2022-03-19 | End: 2022-09-07

## 2022-03-19 NOTE — PROGRESS NOTES
Assessment & Plan     Acute left-sided thoracic back pain  Most likely this is secondary to the heavy lifting.  Could consider rhomboid/trapezius strain given the exacerbation of pain.  I recommended that she see physical therapy to help with improving the muscles, Advil as needed every 8 hours.  She declined Tylenol.  I did provide a work note to help with having her not doing activities that could be exacerbating the pain.  Recommended that she follow-up with her PCP to discuss symptoms further.  Unseld her on red flag symptoms that would warrant coming back or going to the emergency department.  Her symptoms do not seem to be consistent with an acute fracture or acute neurologic change.  - ibuprofen (ADVIL/MOTRIN) 800 MG tablet; Take 1 tablet (800 mg) by mouth every 8 hours as needed for moderate pain  - Physical Therapy Referral; Future    Review of prior external note(s) from - Epilepsy office visit  30 minutes spent on the date of the encounter doing chart review, history and exam, documentation and further activities per the note         Return in about 2 weeks (around 4/2/2022).    Deyanira Rose MD PGY2  [facility]   Lina Turcios is a 31 year old who presents for the following health issues    HPI     Approximately 2-week history of worsening left-sided thoracic back pain.  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  She says that within the past 2 weeks, she has been developing left sided shoulder blade pain, and some numbness and tingling that is gone down her left arm about half-way.  Denies any significant, acute pinpoint pain.  She says that she has been working with a physical therapist at her job, who has put KT tape on it.  She does not think that this has been helping.  She said that she went to work for about 6 hours, and had to come here for evaluation because  of the shoulder pain.  She thinks over time is gotten worse.  She mentions that her  has noted that the area seem to be warm to touch last night.    At the present time, she is unable to lift heavy objects above her head, and does not feel as though she can do her normal routine at work at this time.      Review of Systems   Complete ROS normal aside noted in HPI      Objective    /76   Pulse 79   Temp 97.7  F (36.5  C)   Resp 18   Wt 81.2 kg (179 lb)   LMP 03/03/2022   SpO2 98%   Breastfeeding No   BMI 34.96 kg/m    Body mass index is 34.96 kg/m .    Physical Exam   GENERAL: healthy, alert and no distress  NECK: no adenopathy, no asymmetry, masses, or scars and thyroid normal to palpation  RESP: lungs clear to auscultation - no rales, rhonchi or wheezes  CV: regular rate and rhythm, normal S1 S2, no S3 or S4, no murmur, click or rub, no peripheral edema and peripheral pulses strong  ABDOMEN: soft, nontender, no hepatosplenomegaly, no masses and bowel sounds normal  MS: no gross musculoskeletal defects noted, no edema, tender to palpation over left thoracic back.  SKIN: no suspicious lesions or rashes  NEURO: Normal bicep, tricep, rotator cuff tests strength and tone, mentation intact and speech normal      ----- Service Performed and Documented by Resident or Fellow ------

## 2022-03-19 NOTE — PATIENT INSTRUCTIONS
Please avoid heavy lifting, bending, twisting worsen the back pain.  Please take ibuprofen up to times a day for pain.

## 2022-03-19 NOTE — LETTER
March 19, 2022    Lina Turcios  [address] [place] [address]      To Whom It May Concern,       Lina was seen in clinic 03/19/22 for back muscle strain. She cannot lift more than 5lbs, and cannot engage in significant bending or twisting, as these motions can worsen the pain. She should have reassessment of her symptoms in 2 weeks.        Sincerely,          Deyanira Rose MD

## 2022-03-29 ENCOUNTER — TELEPHONE (OUTPATIENT)
Dept: PHYSICAL THERAPY | Facility: REHABILITATION | Age: 32
End: 2022-03-29
Payer: COMMERCIAL

## 2022-03-29 ENCOUNTER — HOSPITAL ENCOUNTER (OUTPATIENT)
Dept: PHYSICAL THERAPY | Facility: REHABILITATION | Age: 32
Discharge: HOME | End: 2022-03-29
Attending: STUDENT IN AN ORGANIZED HEALTH CARE EDUCATION/TRAINING PROGRAM
Payer: OTHER MISCELLANEOUS

## 2022-03-29 DIAGNOSIS — M54.6 ACUTE LEFT-SIDED THORACIC BACK PAIN: ICD-10-CM

## 2022-03-29 DIAGNOSIS — S29.012D REPETITIVE STRAIN INJURY OF MID BACK, SUBSEQUENT ENCOUNTER: Primary | ICD-10-CM
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
## 2022-03-29 PROCEDURE — 97161 PT EVAL LOW COMPLEX 20 MIN: CPT | Mod: GP

## 2022-03-29 PROCEDURE — 97110 THERAPEUTIC EXERCISES: CPT | Mod: GP

## 2022-03-29 NOTE — PROGRESS NOTES
"   03/29/22 1100   General Information   Type of Visit Initial OP Ortho PT Evaluation   Start of Care Date 03/29/22   Referring Physician Dr. Deyanira Rose MD   Patient/Family Goals Statement \"Stretching/strengthening back and alleviate pain\"   Orders Evaluate and Treat   Date of Order 03/19/22   Medical Diagnosis Acute left-sided thoracic back pain   Surgical/Medical history reviewed Yes   Weight-Bearing Status - LUE   (Limited to 5 pounds lifting at work)   Presentation and Etiology   Pertinent history of current problem (include personal factors and/or comorbidities that impact the POC) Lina said that over the last several weeks she started developing pain under her left shoulder blade that radiates up to her left shoulder with no apparent mechanism of injury. She feels like it is muscle related and due to overuse at work. 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 At one point she developed what felt like a \"tennis ball\" with inflammation in that area, but those symptoms have not returned. She also gets some numbness and tingling down the upper arm but not past the elbow.   Impairments A. Pain;B. Decreased WB tolerance;D. Decreased ROM;E. Decreased flexibility;F. Decreased strength and endurance;K. Numbness;L. Tingling   Functional Limitations perform activities of daily living;perform required work activities;perform desired leisure / sports activities   Symptom Location Left thoracic spine up into left shoulder and neck.   How/Where did it occur With repetition/overuse   Onset date of current episode/exacerbation 03/04/22   Pain rating (0-10 point scale) Best (/10);Worst (/10);Other   Best (/10) 2/10   Worst (/10) 8/10   Pain rating comment Current: 3/10   Pain quality B. Dull   Frequency of pain/symptoms A. Constant   Pain/symptoms are: The same all the time   Pain/symptoms exacerbated by C. Lifting;D. Carrying;G. Certain positions;H. Overhead reach;L. Work tasks "   Pain/symptoms eased by H. Cold;I. OTC medication(s)   Progression of symptoms since onset: Improved   Prior Level of Function   Prior Level of Function-Mobility Independent   Prior Level of Function-ADLs Independent   Fall Risk Screen   Fall screen completed by PT   Have you fallen 2 or more times in the past year? No   Have you fallen and had an injury in the past year? No   Is patient a fall risk? No   Abuse Screen (yes response referral indicated)   Feels Unsafe at Home or Work/School no   Feels Threatened by Someone no   Does Anyone Try to Keep You From Having Contact with Others or Doing Things Outside Your Home? no   Physical Signs of Abuse Present no   System Outcome Measures   Outcome Measures Low Back Pain (see Oswestry and Betzaida)  (28%)   Cervical Spine   Observation Forward head and rounded shoulders with decreased thoracic spine kyphosis.   Cervical Flexion ROM WNL   Cervical Extension ROM 75% - 99%   Cervical Right Side Bending ROM WNL   Cervical Left Side Bending ROM WNL   Cervical Right Rotation ROM WNL   Cervical Left Rotation ROM 50% - 75%   Shoulder Shrug (C2-C4) Strength Clear   Shoulder Abd (C5) Strength Clear   Shoulder Add (C7) Strength Clear   Shoulder ER (C5, C6) Strength Clear   Shoulder IR (C5, C6) Strength Clear   Elbow Flexion (C5, C6) Strength Clear   Elbow Extension (C7) Strength Clear   Wrist Extension (C6) Strength Clear   Wrist Flexion (C7) Strength Clear   Thumb Abd (C8) Strength Clear   5th Finger Add (T1) Strength Clear   Shoulder/Wrist/Hand Strength Comments Myotomes clear, but shoulder abduction and elbow flexion/extension limited by pain.   Segmental Mobility-Cervical Hypomobile   Segmental Mobility-Thoracic Hypomobile   Palpation Tender to palpaion along left scapula medial border. Marked muscle tension throughout thoracic and cervical paraspinals.   Dermatome/Sensory Testing Intact   Shoulder Objective Findings   Observation Forward head and rounded shoulders with decreased  thoracic spine kyphosis.   Shoulder Special Tests Comments Rhomboids: L 4-/5, R 5/5   Palpation Tender to palpaion along left scapula medial border. Marked muscle tension throughout thoracic and cervical paraspinals.   Left Shoulder Flexion AROM 120 degrees  (R: WNL)   Left Shoulder Abduction AROM WNL  (R: WNL)   Left Shoulder ER AROM WNL  (R: WNL)   Left Shoulder IR AROM T12  (R: T7)   Left Shoulder Abduction Strength 4/5 (painful)  (R: 5/5)   Left Shoulder ER Strength 4+/5 (painful)  (R: 5/5)   Left Shoulder IR Strength 5/5  (R: 5/5)   Left Mid Trapezius Strength 4-/5  (R: 5/5)   Planned Therapy Interventions   Planned Therapy Interventions joint mobilization;manual therapy;neuromuscular re-education;ROM;strengthening;stretching   Planned Modality Interventions   Planned Modality Interventions Cryotherapy;Electrical stimulation;Hot packs   Clinical Impression   Criteria for Skilled Therapeutic Interventions Met yes, treatment indicated   PT Diagnosis Thoracic spine pain with radiating pain and associated ROM and muscle power deficits   Influenced by the following impairments Pain, decreased ROM, decreased strength   Functional limitations due to impairments ADLs, work tasks, leisure activities   Clinical Presentation Stable/Uncomplicated   Clinical Decision Making (Complexity) Low complexity   Therapy Frequency   (1 - 2 times/week)   Predicted Duration of Therapy Intervention (days/wks) 5 to 6 weeks   Risk & Benefits of therapy have been explained Yes   Patient, Family & other staff in agreement with plan of care Yes   Clinical Impression Comments Lina is a 31-year-old female who presents to physcial therapy with signs and symptoms consistent with L middle trapezius/rhomboids muscle strain due to repetitive strain/overuse, including L thoracic and shoulder pain, decreased strength, decreased ROM, limited cervical and thoracic spine joint mobility, and marked muscle tension throughout the thoracic spine  paraspinals and periscapular muscles. These impairments limit the patient's ability to safely and independently participate in ADLs, work tasks, and leisure activities. She will benefit from skilled therapy to address the listed impairments and limitations.   Ortho Goal 1   Goal Identifier MARIE   Goal Description Lina will demonstrate improved functional independence as evidence by a decrease in her MARIE score to less than 3%.   Goal Progress 28%   Target Date 05/10/22   Ortho Goal 2   Goal Identifier Work   Goal Description Lina will be able to work for 6 hours (half shift) without restrictions and without increase in symptoms.   Goal Progress Unable without restrictions   Target Date 04/26/22   Ortho Goal 3   Goal Identifier Lifting   Goal Description Lina will be able to lift up to 30 pounds overhead without increase in symptoms.   Goal Progress 5 pounds with increase in symptoms   Target Date 05/03/22   Total Evaluation Time   PT Eval, Low Complexity Minutes (83105) 20

## 2022-03-29 NOTE — TELEPHONE ENCOUNTER
I called Lina to follow-up regarding her question about whether I could write an order to extend her work restrictions. She did not , but I left a message letting her know that she would need to reach out to Dr. Rose who wrote the initial order to get an extension.

## 2022-03-31 ENCOUNTER — TELEPHONE (OUTPATIENT)
Dept: FAMILY MEDICINE | Facility: CLINIC | Age: 32
End: 2022-03-31
Payer: COMMERCIAL

## 2022-03-31 ENCOUNTER — HOSPITAL ENCOUNTER (OUTPATIENT)
Dept: PHYSICAL THERAPY | Facility: REHABILITATION | Age: 32
Discharge: HOME | End: 2022-03-31
Payer: OTHER MISCELLANEOUS
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
## 2022-03-31 DIAGNOSIS — M54.6 ACUTE LEFT-SIDED THORACIC BACK PAIN: Primary | ICD-10-CM

## 2022-03-31 DIAGNOSIS — S29.012D REPETITIVE STRAIN INJURY OF MID BACK, SUBSEQUENT ENCOUNTER: ICD-10-CM

## 2022-03-31 PROCEDURE — 97110 THERAPEUTIC EXERCISES: CPT | Mod: GP

## 2022-03-31 NOTE — TELEPHONE ENCOUNTER
Please notify patient that she will need to follow-up with her primary care physician in a visit in order to get an updated work restrictions letter.

## 2022-03-31 NOTE — TELEPHONE ENCOUNTER
Reason for Call:  Other letter/note    Detailed comments: Patient called asking if Dr Rose could review the not from PT Spine evaluation and give her an updated restrictions letter/note for her job. It was communicated to the patient that this is something her primary can do, patient stated she will look into it and give us a call back, please advise.    Phone Number Patient can be reached at: Home number on file [phone] (home)    Best Time: Any    Can we leave a detailed message on this number? YES    Call taken on 3/31/2022 at 10:58 AM by Shyla Mccabe

## 2022-03-31 NOTE — TELEPHONE ENCOUNTER
Patient notified of information and instructions as per Provider in Walk In [facility].  Patient verbalized understanding and will call to arrange an appointment with primary care.  Patient thanked for the call back.

## 2022-04-07 ENCOUNTER — HOSPITAL ENCOUNTER (OUTPATIENT)
Dept: PHYSICAL THERAPY | Facility: REHABILITATION | Age: 32
Discharge: HOME | End: 2022-04-07
Payer: OTHER MISCELLANEOUS
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
## 2022-04-07 DIAGNOSIS — M54.6 ACUTE LEFT-SIDED THORACIC BACK PAIN: Primary | ICD-10-CM

## 2022-04-07 DIAGNOSIS — S29.012D REPETITIVE STRAIN INJURY OF MID BACK, SUBSEQUENT ENCOUNTER: ICD-10-CM

## 2022-04-07 PROCEDURE — 97110 THERAPEUTIC EXERCISES: CPT | Mod: GP

## 2022-04-08 ENCOUNTER — MYC MEDICAL ADVICE (OUTPATIENT)
Dept: FAMILY MEDICINE | Facility: CLINIC | Age: 32
End: 2022-04-08
Payer: COMMERCIAL

## 2022-04-08 ENCOUNTER — TELEPHONE (OUTPATIENT)
Dept: PEDIATRICS | Facility: CLINIC | Age: 32
End: 2022-04-08
Payer: COMMERCIAL

## 2022-04-08 NOTE — LETTER
April 15, 2022      Lina Turcios  [address] [id number]          Lina is currently undergoing physical therapy.She can have her restrictions reduced and now lift/carry 20 pounds without restriction.       Sincerely,        Jason Olivera MD

## 2022-04-08 NOTE — LETTER
April 8, 2022      Lina ALLEN Turcios  [address] [place] [id number]        To Whom It May Concern,      Patient's daughter had hand, foot, and mouth infection and is no longer infectious. Lina has not had any signs of hand, foot, and mouth infection.          Sincerely,        Jason Olivera MD

## 2022-04-08 NOTE — TELEPHONE ENCOUNTER
Called and spoke with Lina.  She had brought her daughter, Teresa, into [employer] to be seen by Ciera Ellison on 4/04/2022.  She requested a note saying she no longer has hand foot and mouth symptoms.  Lina isn't able to get in to see her PCP for another week- and says her employer is asking for this letter.  Are you able to type up a letter for Lina- stating that she (herself) doesn't have hand foot and mouth symptoms?  And that it's safe for her to return to work?      Thanks,  Martina, CMA

## 2022-04-08 NOTE — LETTER
April 12, 2022      Lina Turcios  [address] [place] [address]        To Whom It May Concern,        Lina is currently undergoing physical therapy.She can have her restrictions reduced and now lift/carry 20 pounds without restriction.       Sincerely,        Jason Olivera MD

## 2022-04-08 NOTE — TELEPHONE ENCOUNTER
Pt is asking for the same thing again - I advised her Dr Brooks's msg. She is wondering if we can ask Dr Rose - Pt is wondering if Dr Rose can review PT visits and modify work restrictions for her - she declined scheduling an appt - her PCP is at . Please advise.

## 2022-04-12 ENCOUNTER — HOSPITAL ENCOUNTER (OUTPATIENT)
Dept: PHYSICAL THERAPY | Facility: REHABILITATION | Age: 32
Discharge: HOME | End: 2022-04-12
Payer: OTHER MISCELLANEOUS
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
## 2022-04-12 DIAGNOSIS — M54.6 ACUTE LEFT-SIDED THORACIC BACK PAIN: Primary | ICD-10-CM

## 2022-04-12 DIAGNOSIS — S29.012D REPETITIVE STRAIN INJURY OF MID BACK, SUBSEQUENT ENCOUNTER: ICD-10-CM

## 2022-04-12 PROCEDURE — 97110 THERAPEUTIC EXERCISES: CPT | Mod: GP

## 2022-04-19 ENCOUNTER — HOSPITAL ENCOUNTER (OUTPATIENT)
Dept: PHYSICAL THERAPY | Facility: REHABILITATION | Age: 32
Discharge: HOME | End: 2022-04-19
Payer: OTHER MISCELLANEOUS

## 2022-04-19 DIAGNOSIS — M54.6 ACUTE LEFT-SIDED THORACIC BACK PAIN: Primary | ICD-10-CM

## 2022-04-19 DIAGNOSIS — S29.012D REPETITIVE STRAIN INJURY OF MID BACK, SUBSEQUENT ENCOUNTER: ICD-10-CM
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
## 2022-04-19 PROCEDURE — 97110 THERAPEUTIC EXERCISES: CPT | Mod: GP

## 2022-04-26 ENCOUNTER — HOSPITAL ENCOUNTER (OUTPATIENT)
Dept: PHYSICAL THERAPY | Facility: REHABILITATION | Age: 32
Discharge: HOME | End: 2022-04-26
Payer: OTHER MISCELLANEOUS

## 2022-04-26 DIAGNOSIS — M54.6 ACUTE LEFT-SIDED THORACIC BACK PAIN: Primary | ICD-10-CM

## 2022-04-26 DIAGNOSIS — S29.012D REPETITIVE STRAIN INJURY OF MID BACK, SUBSEQUENT ENCOUNTER: ICD-10-CM
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
## 2022-04-26 PROCEDURE — 97110 THERAPEUTIC EXERCISES: CPT | Mod: GP

## 2022-05-17 ENCOUNTER — TELEPHONE (OUTPATIENT)
Dept: PHYSICAL THERAPY | Facility: REHABILITATION | Age: 32
End: 2022-05-17
Payer: COMMERCIAL

## 2022-05-17 NOTE — TELEPHONE ENCOUNTER
I called Lina regarding her missed appointment today, and she said that she recently had a death in the family, and she forgot about it. As we had planned on today being her last visit, we discussed whether she felt like she would want to reschedule or not. She said she would try and get an appointment with her doctor and see what he said regarding work restrictions and then contact us about whether she wants to schedule one more visit or not.    I also relayed the message from [employer] stating that they had not been able to contact her workers comp insurance to process the certification. Lina said she would contact the insurance directly and let us know what she finds out.

## 2022-06-07 ENCOUNTER — VIRTUAL VISIT (OUTPATIENT)
Dept: FAMILY MEDICINE | Facility: CLINIC | Age: 32
End: 2022-06-07
Payer: OTHER MISCELLANEOUS

## 2022-06-07 DIAGNOSIS — S46.812A TRAPEZIUS STRAIN, LEFT, INITIAL ENCOUNTER: Primary | ICD-10-CM

## 2022-06-07 PROCEDURE — 99213 OFFICE O/P EST LOW 20 MIN: CPT | Mod: 95 | Performed by: FAMILY MEDICINE

## 2022-06-07 NOTE — PROGRESS NOTES
Lina is a 31 year old who is being evaluated via a billable video visit.      How would you like to obtain your AVS? MyChart  If the video visit is dropped, the invitation should be resent by: Text to cell phone: [phone]  Will anyone else be joining your video visit? No     Video Start Time: 7:57 AM    Assessment & Plan     Trapezius strain, left, initial encounter  Patient with a previous diagnosis of a left trapezius strain now sufficiently rehabilitated.    Plan  I drafted a letter in my chart stating the patient could return to work as of June 9 to return Vanessa 10 with no restrictions and she will follow-up as needed for this.       No follow-ups on file.    Dong Peralta MD  [facility]   Lina is a 31 year old who presents for the following health issues  Patient reports that on March 4 at work she sustained an injury to the left shoulder blade area, she was seen in urgent care on March 19 diagnosed with a left trapezius strain, physical therapy and stretching and conservative interventions were recommended which she has done.  She also has been doing some stretching at home.  She has been under work restrictions but she is feeling that she has fully recovered but she needs a note allowing her to return to work with no restrictions.    Patient is scheduled to return to work on Vanessa 10 and she does feel that she can go to work with no restrictions and I will put a letter in my chart to that effect.       Review of Systems   Constitutional, HEENT, cardiovascular, pulmonary, gi and gu systems are negative, except as otherwise noted.      Objective           Vitals:  No vitals were obtained today due to virtual visit.    Physical Exam   GENERAL: Healthy, alert and no distress  EYES: Eyes grossly normal to inspection.  No discharge or erythema, or obvious scleral/conjunctival abnormalities.  RESP: No audible wheeze, cough, or visible cyanosis.  No visible  retractions or increased work of breathing.    SKIN: Visible skin clear. No significant rash, abnormal pigmentation or lesions.  NEURO: Cranial nerves grossly intact.  Mentation and speech appropriate for age.  PSYCH: Mentation appears normal, affect normal/bright, judgement and insight intact, normal speech and appearance well-groomed.              Video-Visit Details    Type of service:  Video Visit    Video End Time:7:47 AM    Originating Location (pt. Location): Home    Distant Location (provider location):  [facility]     Platform used for Video Visit: [employer]  Answers for HPI/ROS submitted by the patient on 6/7/2022  Your back pain is: new  What do you think is the original cause of your back pain?: lifting  When did you first notice your back pain? : more than 1 month ago  How would you describe your back pain? : dull ache  How often do you feel your back pain? : rarely  Where is your back pain located? : left upper back, left shoulder  Where does your back pain spread? : nowhere  Since you noticed your back pain, how has it changed? : gradually improving  Does your back pain interfere with your job?: No  On a scale of 1-10 (10 being the worst), how strong is your back pain?: 1  What makes your back pain worse? : other  Acupuncture:: not tried  Acetaminophen: not helpful  Activity or Exercise: helpful  Chiropractor: not tried  Cold: not tried  Heat: helpful  Massage: helpful  Muscle relaxants : not tried  NSAIDS (Ibuprofen, Naproxen) : helpful  Opioids: not tried  Physical Therapy: helpful  Rest: helpful  Steroid Injection: not tried  Stretching : helpful  Surgery: not tried  TENS Unit: not tried  Do you see any other healthcare providers for your back pain? : Physical Therapist  How many servings of fruits and vegetables do you eat daily?: 2-3  On average, how many sweetened beverages do you drink each day (Examples: soda, juice, sweet tea, etc.  Do NOT count diet or  artificially sweetened beverages)?: 2  How many minutes a day do you exercise enough to make your heart beat faster?: 20 to 29  How many days a week do you exercise enough to make your heart beat faster?: 4  How many days per week do you miss taking your medication?: 0

## 2022-06-07 NOTE — LETTER
[facility]  [address] [address]  [phone]  Dept: [phone]      6/7/2022    Re: Lina Turcios      TO WHOM IT MAY CONCERN:    Lina Turcios  was seen on June 7, 2022.    Please allow the above-named person to return to work as of June 9, 2022 with no restrictions.  She currently has been under restrictions but it is felt that she has recovered sufficiently to return to work with no restrictions.    Cordtima Peralta MD  [facility]

## 2022-06-29 NOTE — PROGRESS NOTES
[facility]    Outpatient Physical Therapy Discharge Note  Patient: Lina Turcios  : 1990    Beginning/End Dates of Reporting Period:  3/29/22 to 22    Referring Provider: Dr. Deyanira Rose MD    Therapy Diagnosis: Thoracic spine pain with radiating pain and associated ROM and muscle power deficits     Client Self Report: Lina said that her back/shoulder continues to improve and feels good, however, she reports that her sleeping position increased her pain and it often takes into the afternoon before it subsides.    Objective Measurements:  Objective Measure: L Shoulder Flexion AROM  Details: WNL (120 degress at eval)  Objective Measure: L Shoulder IR AROM  Details: Minimally limited compared to R (T12 at eval)  Objective Measure: L Mid Trap Muscle Strength  Details: 5/5 (4-/5 at eval)  Objective Measure: L Shoulder Abduction Strength  Details: 5/5 (4/5 at eval)      Outcome Measures (most recent score):  MARIE: 28%    Goals:  Goal Identifier MARIE   Goal Description Lina will demonstrate improved functional independence as evidence by a decrease in her MARIE score to less than 3%.   Target Date 05/10/22   Date Met      Progress (detail required for progress note): Not measured     Goal Identifier Work   Goal Description Lina will be able to work for 6 hours (half shift) without restrictions and without increase in symptoms.   Target Date 22   Date Met      Progress (detail required for progress note): 6 hours with restrictions     Goal Identifier Lifting   Goal Description Lina will be able to lift up to 30 pounds overhead without increase in symptoms.   Target Date 22   Date Met      Progress (detail required for progress note): 20 pounds only lifting at waist level       Plan:  Discharge from therapy.    Discharge:    Reason for Discharge: Lina was close to meeting all of  her goals, and chose to discontinue therapy. Patient has failed to schedule further appointments.    Equipment Issued: NA    Discharge Plan: Patient to continue home program.

## 2022-06-29 NOTE — ADDENDUM NOTE
Encounter addended by: Bronson Mccann, PT on: 6/29/2022 7:50 AM   Actions taken: Clinical Note Signed, Episode resolved

## 2022-07-07 ENCOUNTER — OFFICE VISIT (OUTPATIENT)
Dept: FAMILY MEDICINE | Facility: CLINIC | Age: 32
End: 2022-07-07
Payer: COMMERCIAL

## 2022-07-07 VITALS
SYSTOLIC BLOOD PRESSURE: 106 MMHG | DIASTOLIC BLOOD PRESSURE: 72 MMHG | RESPIRATION RATE: 14 BRPM | HEART RATE: 88 BPM | BODY MASS INDEX: 34.37 KG/M2 | TEMPERATURE: 97.9 F | WEIGHT: 176 LBS | OXYGEN SATURATION: 98 %

## 2022-07-07 DIAGNOSIS — J06.9 VIRAL URI: Primary | ICD-10-CM

## 2022-07-07 DIAGNOSIS — J02.9 SORE THROAT: ICD-10-CM

## 2022-07-07 LAB
DEPRECATED S PYO AG THROAT QL EIA: NEGATIVE
GROUP A STREP BY PCR: NOT DETECTED

## 2022-07-07 PROCEDURE — 99213 OFFICE O/P EST LOW 20 MIN: CPT | Performed by: FAMILY MEDICINE

## 2022-07-07 PROCEDURE — 87651 STREP A DNA AMP PROBE: CPT | Performed by: FAMILY MEDICINE

## 2022-07-07 NOTE — PROGRESS NOTES
Assessment:       Viral URI    Sore throat  - Streptococcus A Rapid Screen w/Reflex to PCR - Clinic Collect  - Group A Streptococcus PCR Throat Swab         Plan:     Symptoms consistent with a viral upper respiratory infection.  Rapid strep screen negative.  Discussed the typical course of symptoms.  Noantibiotics indicated at this time.  Recommend symptomatic treatment such as decongestants and acetominephen or ibuprofen as needed.  Recommend follow up if getting worse or not improving.    Subjective:       31 year old female presents for evaluation 2-day history of sore throat and cough.  She has not had a fever.  No shortness of breath or wheezing.  Wants to make sure she does not have strep throat.  Other family members have similar symptoms.  Denies headaches.    Patient Active Problem List   Diagnosis     Anxiety Disorder NOS     Abdominal Pain Around The Belly Button (Periumbilical)     Fatigue     Vitamin D Deficiency       Past Medical History:   Diagnosis Date     Depression        Past Surgical History:   Procedure Laterality Date      SECTION         Current Outpatient Medications   Medication     ibuprofen (ADVIL/MOTRIN) 200 MG tablet     ibuprofen (ADVIL/MOTRIN) 800 MG tablet     PARoxetine (PAXIL) 30 MG tablet     No current facility-administered medications for this visit.       No Known Allergies    Family History   Adopted: Yes   Problem Relation Age of Onset     No Known Problems Mother      No Known Problems Father        Social History     Socioeconomic History     Marital status:      Spouse name: None     Number of children: None     Years of education: None     Highest education level: None   Tobacco Use     Smoking status: Former Smoker     Smokeless tobacco: Former User   Substance and Sexual Activity     Alcohol use: Yes     Comment: a glass or two 3 to 4 nights a week.     Drug use: Never         Review of Systems  Pertinent items are noted in HPI.      Objective:                       General Appearance:    /72   Pulse 88   Temp 97.9  F (36.6  C)   Resp 14   Wt 79.8 kg (176 lb)   LMP 05/07/2022   SpO2 98%   Breastfeeding No   BMI 34.37 kg/m          Alert, pleasant, cooperative, no distress, appears stated age   Head:    Normocephalic, without obvious abnormality, atraumatic   Eyes:    Conjunctiva/corneas clear   Ears:    Normal TM's without erythema or bulging. Normal external ear canals, both ears   Nose:   Nares normal, septum midline, mucosa normal, no drainage    or sinus tenderness   Throat:   Lips, mucosa, and tongue normal; teeth and gums normal.  No tonsilar hypertrophy or exudate.   Neck:   Supple, symmetrical, trachea midline, no adenopathy    Lungs:     Clear to auscultation bilaterally without wheezes, rales, or rhonchi, respirations unlabored    Heart:    Regular rate and rhythm, S1 and S2 normal, no murmur, rub or gallop       Extremities:   Extremities normal, atraumatic, no cyanosis or edema   Skin:   Skin color, texture, turgor normal, no rashes or lesions         Results for orders placed or performed in visit on 07/07/22   Streptococcus A Rapid Screen w/Reflex to PCR - Clinic Collect     Status: Normal    Specimen: Throat; Swab   Result Value Ref Range    Group A Strep antigen Negative Negative       This note has been dictated using voice recognition software. Any grammatical or context distortions are unintentional and inherent to the software

## 2022-08-10 ENCOUNTER — TRANSFERRED RECORDS (OUTPATIENT)
Dept: HEALTH INFORMATION MANAGEMENT | Facility: CLINIC | Age: 32
End: 2022-08-10

## 2022-09-06 ENCOUNTER — LAB REQUISITION (OUTPATIENT)
Dept: LAB | Facility: CLINIC | Age: 32
End: 2022-09-06

## 2022-09-06 DIAGNOSIS — Z01.818 ENCOUNTER FOR OTHER PREPROCEDURAL EXAMINATION: ICD-10-CM

## 2022-09-06 LAB
ALBUMIN SERPL BCG-MCNC: 4.2 G/DL (ref 3.5–5.2)
ALP SERPL-CCNC: 59 U/L (ref 35–104)
ALT SERPL W P-5'-P-CCNC: 13 U/L (ref 10–35)
ANION GAP SERPL CALCULATED.3IONS-SCNC: 12 MMOL/L (ref 7–15)
AST SERPL W P-5'-P-CCNC: 22 U/L (ref 10–35)
BILIRUB SERPL-MCNC: 0.4 MG/DL
BUN SERPL-MCNC: 11.2 MG/DL (ref 6–20)
CALCIUM SERPL-MCNC: 8.9 MG/DL (ref 8.6–10)
CHLORIDE SERPL-SCNC: 106 MMOL/L (ref 98–107)
CREAT SERPL-MCNC: 0.67 MG/DL (ref 0.51–0.95)
DEPRECATED HCO3 PLAS-SCNC: 22 MMOL/L (ref 22–29)
GFR SERPL CREATININE-BSD FRML MDRD: >90 ML/MIN/1.73M2
GLUCOSE SERPL-MCNC: 87 MG/DL (ref 70–99)
POTASSIUM SERPL-SCNC: 3.8 MMOL/L (ref 3.4–5.3)
PROT SERPL-MCNC: 6.9 G/DL (ref 6.4–8.3)
SODIUM SERPL-SCNC: 140 MMOL/L (ref 136–145)

## 2022-09-06 PROCEDURE — 80053 COMPREHEN METABOLIC PANEL: CPT | Performed by: NURSE PRACTITIONER

## 2022-09-07 RX ORDER — OMEGA-3 FATTY ACIDS/FISH OIL 300-1000MG
200 CAPSULE ORAL EVERY 4 HOURS PRN
COMMUNITY
End: 2023-11-08

## 2022-09-10 ENCOUNTER — HEALTH MAINTENANCE LETTER (OUTPATIENT)
Age: 32
End: 2022-09-10

## 2022-09-10 ENCOUNTER — LAB (OUTPATIENT)
Dept: LAB | Facility: CLINIC | Age: 32
End: 2022-09-10
Payer: COMMERCIAL

## 2022-09-10 DIAGNOSIS — Z20.822 ENCOUNTER FOR LABORATORY TESTING FOR COVID-19 VIRUS: ICD-10-CM

## 2022-09-10 PROCEDURE — U0005 INFEC AGEN DETEC AMPLI PROBE: HCPCS

## 2022-09-10 PROCEDURE — U0003 INFECTIOUS AGENT DETECTION BY NUCLEIC ACID (DNA OR RNA); SEVERE ACUTE RESPIRATORY SYNDROME CORONAVIRUS 2 (SARS-COV-2) (CORONAVIRUS DISEASE [COVID-19]), AMPLIFIED PROBE TECHNIQUE, MAKING USE OF HIGH THROUGHPUT TECHNOLOGIES AS DESCRIBED BY CMS-2020-01-R: HCPCS

## 2022-09-11 LAB — SARS-COV-2 RNA RESP QL NAA+PROBE: NEGATIVE

## 2022-09-13 ENCOUNTER — ANESTHESIA (OUTPATIENT)
Dept: SURGERY | Facility: CLINIC | Age: 32
End: 2022-09-13
Payer: COMMERCIAL

## 2022-09-13 ENCOUNTER — HOSPITAL ENCOUNTER (OUTPATIENT)
Facility: CLINIC | Age: 32
Discharge: HOME | End: 2022-09-14
Attending: OBSTETRICS & GYNECOLOGY | Admitting: OBSTETRICS & GYNECOLOGY
Payer: COMMERCIAL

## 2022-09-13 ENCOUNTER — ANESTHESIA EVENT (OUTPATIENT)
Dept: SURGERY | Facility: CLINIC | Age: 32
End: 2022-09-13
Payer: COMMERCIAL

## 2022-09-13 DIAGNOSIS — Z98.890 S/P MYOMECTOMY: Primary | ICD-10-CM

## 2022-09-13 PROBLEM — D25.9 UTERINE FIBROIDS AFFECTING PREGNANCY: Status: ACTIVE | Noted: 2022-09-13

## 2022-09-13 PROBLEM — O34.10 UTERINE FIBROIDS AFFECTING PREGNANCY: Status: ACTIVE | Noted: 2022-09-13

## 2022-09-13 LAB
HCG SERPL-ACNC: <2 MLU/ML (ref 0–4)
HGB BLD-MCNC: 13.2 G/DL (ref 11.7–15.7)

## 2022-09-13 PROCEDURE — 85018 HEMOGLOBIN: CPT | Performed by: OBSTETRICS & GYNECOLOGY

## 2022-09-13 PROCEDURE — 360N000077 HC SURGERY LEVEL 4, PER MIN: Performed by: OBSTETRICS & GYNECOLOGY

## 2022-09-13 PROCEDURE — 250N000009 HC RX 250: Performed by: OBSTETRICS & GYNECOLOGY

## 2022-09-13 PROCEDURE — 250N000011 HC RX IP 250 OP 636: Performed by: NURSE ANESTHETIST, CERTIFIED REGISTERED

## 2022-09-13 PROCEDURE — 250N000025 HC SEVOFLURANE, PER MIN: Performed by: OBSTETRICS & GYNECOLOGY

## 2022-09-13 PROCEDURE — 250N000009 HC RX 250: Performed by: NURSE ANESTHETIST, CERTIFIED REGISTERED

## 2022-09-13 PROCEDURE — 272N000001 HC OR GENERAL SUPPLY STERILE: Performed by: OBSTETRICS & GYNECOLOGY

## 2022-09-13 PROCEDURE — 250N000013 HC RX MED GY IP 250 OP 250 PS 637: Performed by: OBSTETRICS & GYNECOLOGY

## 2022-09-13 PROCEDURE — 250N000011 HC RX IP 250 OP 636: Performed by: OBSTETRICS & GYNECOLOGY

## 2022-09-13 PROCEDURE — 250N000011 HC RX IP 250 OP 636: Performed by: ANESTHESIOLOGY

## 2022-09-13 PROCEDURE — 710N000010 HC RECOVERY PHASE 1, LEVEL 2, PER MIN: Performed by: OBSTETRICS & GYNECOLOGY

## 2022-09-13 PROCEDURE — 88305 TISSUE EXAM BY PATHOLOGIST: CPT | Mod: 26 | Performed by: PATHOLOGY

## 2022-09-13 PROCEDURE — 370N000017 HC ANESTHESIA TECHNICAL FEE, PER MIN: Performed by: OBSTETRICS & GYNECOLOGY

## 2022-09-13 PROCEDURE — 258N000003 HC RX IP 258 OP 636: Performed by: ANESTHESIOLOGY

## 2022-09-13 PROCEDURE — 88305 TISSUE EXAM BY PATHOLOGIST: CPT | Mod: TC | Performed by: OBSTETRICS & GYNECOLOGY

## 2022-09-13 PROCEDURE — 999N000141 HC STATISTIC PRE-PROCEDURE NURSING ASSESSMENT: Performed by: OBSTETRICS & GYNECOLOGY

## 2022-09-13 PROCEDURE — 36415 COLL VENOUS BLD VENIPUNCTURE: CPT | Performed by: OBSTETRICS & GYNECOLOGY

## 2022-09-13 PROCEDURE — 258N000003 HC RX IP 258 OP 636: Performed by: NURSE ANESTHETIST, CERTIFIED REGISTERED

## 2022-09-13 PROCEDURE — C1765 ADHESION BARRIER: HCPCS | Performed by: OBSTETRICS & GYNECOLOGY

## 2022-09-13 PROCEDURE — 84702 CHORIONIC GONADOTROPIN TEST: CPT | Performed by: OBSTETRICS & GYNECOLOGY

## 2022-09-13 PROCEDURE — 258N000003 HC RX IP 258 OP 636: Performed by: OBSTETRICS & GYNECOLOGY

## 2022-09-13 PROCEDURE — C9290 INJ, BUPIVACAINE LIPOSOME: HCPCS | Performed by: ANESTHESIOLOGY

## 2022-09-13 RX ORDER — HYDROMORPHONE HCL IN WATER/PF 6 MG/30 ML
0.2 PATIENT CONTROLLED ANALGESIA SYRINGE INTRAVENOUS EVERY 5 MIN PRN
Status: DISCONTINUED | OUTPATIENT
Start: 2022-09-13 | End: 2022-09-13 | Stop reason: HOSPADM

## 2022-09-13 RX ORDER — CEFAZOLIN SODIUM/WATER 2 G/20 ML
2 SYRINGE (ML) INTRAVENOUS
Status: COMPLETED | OUTPATIENT
Start: 2022-09-13 | End: 2022-09-13

## 2022-09-13 RX ORDER — ACETAMINOPHEN 325 MG/1
650 TABLET ORAL EVERY 6 HOURS
Status: DISCONTINUED | OUTPATIENT
Start: 2022-09-16 | End: 2022-09-14 | Stop reason: HOSPADM

## 2022-09-13 RX ORDER — NALOXONE HYDROCHLORIDE 0.4 MG/ML
0.2 INJECTION, SOLUTION INTRAMUSCULAR; INTRAVENOUS; SUBCUTANEOUS
Status: DISCONTINUED | OUTPATIENT
Start: 2022-09-13 | End: 2022-09-14 | Stop reason: HOSPADM

## 2022-09-13 RX ORDER — LIDOCAINE 40 MG/G
CREAM TOPICAL
Status: DISCONTINUED | OUTPATIENT
Start: 2022-09-13 | End: 2022-09-14 | Stop reason: HOSPADM

## 2022-09-13 RX ORDER — ACETAMINOPHEN 325 MG/1
975 TABLET ORAL ONCE
Status: COMPLETED | OUTPATIENT
Start: 2022-09-13 | End: 2022-09-13

## 2022-09-13 RX ORDER — ACETAMINOPHEN 500 MG
500-1000 TABLET ORAL EVERY 6 HOURS PRN
COMMUNITY
End: 2023-11-08

## 2022-09-13 RX ORDER — NALOXONE HYDROCHLORIDE 0.4 MG/ML
0.4 INJECTION, SOLUTION INTRAMUSCULAR; INTRAVENOUS; SUBCUTANEOUS
Status: DISCONTINUED | OUTPATIENT
Start: 2022-09-13 | End: 2022-09-14 | Stop reason: HOSPADM

## 2022-09-13 RX ORDER — ACETAMINOPHEN 325 MG/1
975 TABLET ORAL EVERY 6 HOURS
Status: DISCONTINUED | OUTPATIENT
Start: 2022-09-13 | End: 2022-09-14 | Stop reason: HOSPADM

## 2022-09-13 RX ORDER — SODIUM CHLORIDE, SODIUM LACTATE, POTASSIUM CHLORIDE, CALCIUM CHLORIDE 600; 310; 30; 20 MG/100ML; MG/100ML; MG/100ML; MG/100ML
INJECTION, SOLUTION INTRAVENOUS CONTINUOUS
Status: DISCONTINUED | OUTPATIENT
Start: 2022-09-13 | End: 2022-09-13 | Stop reason: HOSPADM

## 2022-09-13 RX ORDER — FENTANYL CITRATE 50 UG/ML
25 INJECTION, SOLUTION INTRAMUSCULAR; INTRAVENOUS
Status: DISCONTINUED | OUTPATIENT
Start: 2022-09-13 | End: 2022-09-14 | Stop reason: HOSPADM

## 2022-09-13 RX ORDER — FENTANYL CITRATE 50 UG/ML
25 INJECTION, SOLUTION INTRAMUSCULAR; INTRAVENOUS EVERY 5 MIN PRN
Status: DISCONTINUED | OUTPATIENT
Start: 2022-09-13 | End: 2022-09-13 | Stop reason: HOSPADM

## 2022-09-13 RX ORDER — KETOROLAC TROMETHAMINE 30 MG/ML
15 INJECTION, SOLUTION INTRAMUSCULAR; INTRAVENOUS EVERY 6 HOURS
Status: DISCONTINUED | OUTPATIENT
Start: 2022-09-13 | End: 2022-09-14 | Stop reason: HOSPADM

## 2022-09-13 RX ORDER — BISACODYL 10 MG
10 SUPPOSITORY, RECTAL RECTAL DAILY PRN
Status: DISCONTINUED | OUTPATIENT
Start: 2022-09-13 | End: 2022-09-14 | Stop reason: HOSPADM

## 2022-09-13 RX ORDER — ONDANSETRON 4 MG/1
4 TABLET, ORALLY DISINTEGRATING ORAL EVERY 30 MIN PRN
Status: DISCONTINUED | OUTPATIENT
Start: 2022-09-13 | End: 2022-09-14 | Stop reason: HOSPADM

## 2022-09-13 RX ORDER — MEPERIDINE HYDROCHLORIDE 25 MG/ML
12.5 INJECTION INTRAMUSCULAR; INTRAVENOUS; SUBCUTANEOUS
Status: DISCONTINUED | OUTPATIENT
Start: 2022-09-13 | End: 2022-09-14 | Stop reason: HOSPADM

## 2022-09-13 RX ORDER — DEXAMETHASONE SODIUM PHOSPHATE 10 MG/ML
INJECTION, SOLUTION INTRAMUSCULAR; INTRAVENOUS PRN
Status: DISCONTINUED | OUTPATIENT
Start: 2022-09-13 | End: 2022-09-13

## 2022-09-13 RX ORDER — HYDROMORPHONE HCL IN WATER/PF 6 MG/30 ML
0.4 PATIENT CONTROLLED ANALGESIA SYRINGE INTRAVENOUS EVERY 5 MIN PRN
Status: DISCONTINUED | OUTPATIENT
Start: 2022-09-13 | End: 2022-09-13 | Stop reason: HOSPADM

## 2022-09-13 RX ORDER — PROPOFOL 10 MG/ML
INJECTION, EMULSION INTRAVENOUS PRN
Status: DISCONTINUED | OUTPATIENT
Start: 2022-09-13 | End: 2022-09-13

## 2022-09-13 RX ORDER — ONDANSETRON 2 MG/ML
4 INJECTION INTRAMUSCULAR; INTRAVENOUS EVERY 6 HOURS PRN
Status: DISCONTINUED | OUTPATIENT
Start: 2022-09-13 | End: 2022-09-14 | Stop reason: HOSPADM

## 2022-09-13 RX ORDER — HALOPERIDOL 5 MG/ML
1 INJECTION INTRAMUSCULAR
Status: DISCONTINUED | OUTPATIENT
Start: 2022-09-13 | End: 2022-09-14 | Stop reason: HOSPADM

## 2022-09-13 RX ORDER — FENTANYL CITRATE 50 UG/ML
INJECTION, SOLUTION INTRAMUSCULAR; INTRAVENOUS PRN
Status: DISCONTINUED | OUTPATIENT
Start: 2022-09-13 | End: 2022-09-13

## 2022-09-13 RX ORDER — LIDOCAINE 40 MG/G
CREAM TOPICAL
Status: DISCONTINUED | OUTPATIENT
Start: 2022-09-13 | End: 2022-09-13 | Stop reason: HOSPADM

## 2022-09-13 RX ORDER — OXYCODONE HYDROCHLORIDE 5 MG/1
10 TABLET ORAL EVERY 4 HOURS PRN
Status: DISCONTINUED | OUTPATIENT
Start: 2022-09-13 | End: 2022-09-14 | Stop reason: HOSPADM

## 2022-09-13 RX ORDER — ONDANSETRON 4 MG/1
4 TABLET, ORALLY DISINTEGRATING ORAL EVERY 6 HOURS PRN
Status: DISCONTINUED | OUTPATIENT
Start: 2022-09-13 | End: 2022-09-14 | Stop reason: HOSPADM

## 2022-09-13 RX ORDER — HYDROMORPHONE HCL IN WATER/PF 6 MG/30 ML
0.4 PATIENT CONTROLLED ANALGESIA SYRINGE INTRAVENOUS
Status: DISCONTINUED | OUTPATIENT
Start: 2022-09-13 | End: 2022-09-14 | Stop reason: HOSPADM

## 2022-09-13 RX ORDER — ONDANSETRON 2 MG/ML
INJECTION INTRAMUSCULAR; INTRAVENOUS PRN
Status: DISCONTINUED | OUTPATIENT
Start: 2022-09-13 | End: 2022-09-13

## 2022-09-13 RX ORDER — BUPIVACAINE HYDROCHLORIDE 2.5 MG/ML
INJECTION, SOLUTION EPIDURAL; INFILTRATION; INTRACAUDAL PRN
Status: DISCONTINUED | OUTPATIENT
Start: 2022-09-13 | End: 2022-09-13

## 2022-09-13 RX ORDER — AMOXICILLIN 250 MG
1 CAPSULE ORAL 2 TIMES DAILY
Status: DISCONTINUED | OUTPATIENT
Start: 2022-09-13 | End: 2022-09-14 | Stop reason: HOSPADM

## 2022-09-13 RX ORDER — HYDROMORPHONE HCL IN WATER/PF 6 MG/30 ML
0.2 PATIENT CONTROLLED ANALGESIA SYRINGE INTRAVENOUS
Status: DISCONTINUED | OUTPATIENT
Start: 2022-09-13 | End: 2022-09-14 | Stop reason: HOSPADM

## 2022-09-13 RX ORDER — SODIUM CHLORIDE, SODIUM LACTATE, POTASSIUM CHLORIDE, CALCIUM CHLORIDE 600; 310; 30; 20 MG/100ML; MG/100ML; MG/100ML; MG/100ML
INJECTION, SOLUTION INTRAVENOUS CONTINUOUS
Status: DISCONTINUED | OUTPATIENT
Start: 2022-09-13 | End: 2022-09-14 | Stop reason: HOSPADM

## 2022-09-13 RX ORDER — OXYCODONE HYDROCHLORIDE 5 MG/1
5 TABLET ORAL EVERY 4 HOURS PRN
Status: DISCONTINUED | OUTPATIENT
Start: 2022-09-13 | End: 2022-09-14 | Stop reason: HOSPADM

## 2022-09-13 RX ORDER — MAGNESIUM HYDROXIDE 1200 MG/15ML
LIQUID ORAL PRN
Status: DISCONTINUED | OUTPATIENT
Start: 2022-09-13 | End: 2022-09-13 | Stop reason: HOSPADM

## 2022-09-13 RX ORDER — CEFAZOLIN SODIUM/WATER 2 G/20 ML
2 SYRINGE (ML) INTRAVENOUS SEE ADMIN INSTRUCTIONS
Status: DISCONTINUED | OUTPATIENT
Start: 2022-09-13 | End: 2022-09-13 | Stop reason: HOSPADM

## 2022-09-13 RX ORDER — VASOPRESSIN 20 U/ML
INJECTION PARENTERAL PRN
Status: DISCONTINUED | OUTPATIENT
Start: 2022-09-13 | End: 2022-09-13 | Stop reason: HOSPADM

## 2022-09-13 RX ORDER — ONDANSETRON 2 MG/ML
4 INJECTION INTRAMUSCULAR; INTRAVENOUS EVERY 30 MIN PRN
Status: DISCONTINUED | OUTPATIENT
Start: 2022-09-13 | End: 2022-09-14 | Stop reason: HOSPADM

## 2022-09-13 RX ORDER — KETAMINE HYDROCHLORIDE 10 MG/ML
INJECTION INTRAMUSCULAR; INTRAVENOUS PRN
Status: DISCONTINUED | OUTPATIENT
Start: 2022-09-13 | End: 2022-09-13

## 2022-09-13 RX ORDER — PROPOFOL 10 MG/ML
INJECTION, EMULSION INTRAVENOUS CONTINUOUS PRN
Status: DISCONTINUED | OUTPATIENT
Start: 2022-09-13 | End: 2022-09-13

## 2022-09-13 RX ORDER — PROCHLORPERAZINE MALEATE 10 MG
10 TABLET ORAL EVERY 6 HOURS PRN
Status: DISCONTINUED | OUTPATIENT
Start: 2022-09-13 | End: 2022-09-14 | Stop reason: HOSPADM

## 2022-09-13 RX ORDER — IBUPROFEN 800 MG/1
800 TABLET, FILM COATED ORAL EVERY 6 HOURS
Status: DISCONTINUED | OUTPATIENT
Start: 2022-09-13 | End: 2022-09-14 | Stop reason: HOSPADM

## 2022-09-13 RX ORDER — HYDRALAZINE HYDROCHLORIDE 20 MG/ML
2.5-5 INJECTION INTRAMUSCULAR; INTRAVENOUS EVERY 10 MIN PRN
Status: DISCONTINUED | OUTPATIENT
Start: 2022-09-13 | End: 2022-09-13 | Stop reason: HOSPADM

## 2022-09-13 RX ORDER — ALBUTEROL SULFATE 0.83 MG/ML
2.5 SOLUTION RESPIRATORY (INHALATION) EVERY 4 HOURS PRN
Status: DISCONTINUED | OUTPATIENT
Start: 2022-09-13 | End: 2022-09-13 | Stop reason: HOSPADM

## 2022-09-13 RX ORDER — LIDOCAINE HYDROCHLORIDE 10 MG/ML
INJECTION, SOLUTION INFILTRATION; PERINEURAL PRN
Status: DISCONTINUED | OUTPATIENT
Start: 2022-09-13 | End: 2022-09-13

## 2022-09-13 RX ADMIN — SUGAMMADEX 150 MG: 100 INJECTION, SOLUTION INTRAVENOUS at 11:49

## 2022-09-13 RX ADMIN — OXYCODONE HYDROCHLORIDE 5 MG: 5 TABLET ORAL at 20:52

## 2022-09-13 RX ADMIN — HYDROMORPHONE HYDROCHLORIDE 0.4 MG: 0.2 INJECTION, SOLUTION INTRAMUSCULAR; INTRAVENOUS; SUBCUTANEOUS at 13:32

## 2022-09-13 RX ADMIN — FENTANYL CITRATE 100 MCG: 50 INJECTION, SOLUTION INTRAMUSCULAR; INTRAVENOUS at 10:30

## 2022-09-13 RX ADMIN — ROCURONIUM BROMIDE 50 MG: 10 INJECTION, SOLUTION INTRAVENOUS at 10:30

## 2022-09-13 RX ADMIN — HYDROMORPHONE HYDROCHLORIDE 0.4 MG: 0.2 INJECTION, SOLUTION INTRAMUSCULAR; INTRAVENOUS; SUBCUTANEOUS at 12:35

## 2022-09-13 RX ADMIN — ONDANSETRON 4 MG: 2 INJECTION INTRAMUSCULAR; INTRAVENOUS at 11:21

## 2022-09-13 RX ADMIN — SODIUM CHLORIDE, POTASSIUM CHLORIDE, SODIUM LACTATE AND CALCIUM CHLORIDE: 600; 310; 30; 20 INJECTION, SOLUTION INTRAVENOUS at 22:04

## 2022-09-13 RX ADMIN — HYDROMORPHONE HYDROCHLORIDE 0.2 MG: 0.2 INJECTION, SOLUTION INTRAMUSCULAR; INTRAVENOUS; SUBCUTANEOUS at 12:54

## 2022-09-13 RX ADMIN — Medication 2 G: at 10:24

## 2022-09-13 RX ADMIN — HYDROMORPHONE HYDROCHLORIDE 0.4 MG: 0.2 INJECTION, SOLUTION INTRAMUSCULAR; INTRAVENOUS; SUBCUTANEOUS at 13:18

## 2022-09-13 RX ADMIN — KETOROLAC TROMETHAMINE 15 MG: 30 INJECTION, SOLUTION INTRAMUSCULAR at 20:00

## 2022-09-13 RX ADMIN — HYDROMORPHONE HYDROCHLORIDE 0.2 MG: 0.2 INJECTION, SOLUTION INTRAMUSCULAR; INTRAVENOUS; SUBCUTANEOUS at 12:47

## 2022-09-13 RX ADMIN — BUPIVACAINE HYDROCHLORIDE 30 ML: 2.5 INJECTION, SOLUTION EPIDURAL; INFILTRATION; INTRACAUDAL at 10:30

## 2022-09-13 RX ADMIN — PHENYLEPHRINE HYDROCHLORIDE 100 MCG: 10 INJECTION INTRAVENOUS at 11:09

## 2022-09-13 RX ADMIN — ACETAMINOPHEN 975 MG: 325 TABLET, FILM COATED ORAL at 22:00

## 2022-09-13 RX ADMIN — DEXAMETHASONE SODIUM PHOSPHATE 10 MG: 10 INJECTION, SOLUTION INTRAMUSCULAR; INTRAVENOUS at 10:52

## 2022-09-13 RX ADMIN — HYDROMORPHONE HYDROCHLORIDE 0.2 MG: 0.2 INJECTION, SOLUTION INTRAMUSCULAR; INTRAVENOUS; SUBCUTANEOUS at 13:05

## 2022-09-13 RX ADMIN — ACETAMINOPHEN 975 MG: 325 TABLET, FILM COATED ORAL at 09:30

## 2022-09-13 RX ADMIN — KETAMINE HYDROCHLORIDE 20 MG: 10 INJECTION, SOLUTION INTRAMUSCULAR; INTRAVENOUS at 10:57

## 2022-09-13 RX ADMIN — FENTANYL CITRATE 25 MCG: 50 INJECTION, SOLUTION INTRAMUSCULAR; INTRAVENOUS at 12:27

## 2022-09-13 RX ADMIN — FENTANYL CITRATE 25 MCG: 50 INJECTION, SOLUTION INTRAMUSCULAR; INTRAVENOUS at 12:37

## 2022-09-13 RX ADMIN — PROPOFOL 50 MCG/KG/MIN: 10 INJECTION, EMULSION INTRAVENOUS at 10:35

## 2022-09-13 RX ADMIN — PROPOFOL 200 MG: 10 INJECTION, EMULSION INTRAVENOUS at 10:30

## 2022-09-13 RX ADMIN — MIDAZOLAM 2 MG: 1 INJECTION INTRAMUSCULAR; INTRAVENOUS at 10:24

## 2022-09-13 RX ADMIN — FENTANYL CITRATE 25 MCG: 50 INJECTION, SOLUTION INTRAMUSCULAR; INTRAVENOUS at 12:32

## 2022-09-13 RX ADMIN — BUPIVACAINE 20 ML: 13.3 INJECTION, SUSPENSION, LIPOSOMAL INFILTRATION at 10:30

## 2022-09-13 RX ADMIN — SODIUM CHLORIDE, POTASSIUM CHLORIDE, SODIUM LACTATE AND CALCIUM CHLORIDE: 600; 310; 30; 20 INJECTION, SOLUTION INTRAVENOUS at 11:29

## 2022-09-13 RX ADMIN — HYDROMORPHONE HYDROCHLORIDE 0.2 MG: 0.2 INJECTION, SOLUTION INTRAMUSCULAR; INTRAVENOUS; SUBCUTANEOUS at 12:59

## 2022-09-13 RX ADMIN — SENNOSIDES AND DOCUSATE SODIUM 1 TABLET: 50; 8.6 TABLET ORAL at 20:00

## 2022-09-13 RX ADMIN — OXYCODONE HYDROCHLORIDE 5 MG: 5 TABLET ORAL at 20:50

## 2022-09-13 RX ADMIN — FENTANYL CITRATE 25 MCG: 50 INJECTION, SOLUTION INTRAMUSCULAR; INTRAVENOUS at 12:22

## 2022-09-13 RX ADMIN — SODIUM CHLORIDE, POTASSIUM CHLORIDE, SODIUM LACTATE AND CALCIUM CHLORIDE: 600; 310; 30; 20 INJECTION, SOLUTION INTRAVENOUS at 09:18

## 2022-09-13 RX ADMIN — LIDOCAINE HYDROCHLORIDE 20 ML: 10 INJECTION, SOLUTION INFILTRATION; PERINEURAL at 10:30

## 2022-09-13 RX ADMIN — HYDROMORPHONE HYDROCHLORIDE 0.2 MG: 0.2 INJECTION, SOLUTION INTRAMUSCULAR; INTRAVENOUS; SUBCUTANEOUS at 12:42

## 2022-09-13 RX ADMIN — HYDROMORPHONE HYDROCHLORIDE 0.4 MG: 0.2 INJECTION, SOLUTION INTRAMUSCULAR; INTRAVENOUS; SUBCUTANEOUS at 13:12

## 2022-09-13 RX ADMIN — ACETAMINOPHEN 975 MG: 325 TABLET, FILM COATED ORAL at 16:10

## 2022-09-13 ASSESSMENT — ACTIVITIES OF DAILY LIVING (ADL)
ADLS_ACUITY_SCORE: 22
ADLS_ACUITY_SCORE: 35
ADLS_ACUITY_SCORE: 22

## 2022-09-13 NOTE — PLAN OF CARE
Patient vitally stable. A/O x4.  Clear liquid diet. Jo in place. Adequate output. Dangled legs at bedside. Stood/marched in place. LR infusing at 100ml/hr. Scheduled tylenol given.  Mepilex in place. CDI.     Remove jo this evening.   Continue to monitor.     Shelby Mcdonough RN

## 2022-09-13 NOTE — PHARMACY-ADMISSION MEDICATION HISTORY
Pharmacy Note - Admission Medication History    Pertinent Provider Information:    ______________________________________________________________________    Prior To Admission (PTA) med list completed and updated in EMR.       PTA Med List   Medication Sig Last Dose     acetaminophen (TYLENOL) 500 MG tablet Take 500-1,000 mg by mouth every 6 hours as needed for mild pain Past Week at Unknown time     ibuprofen (ADVIL/MOTRIN) 200 MG capsule Take 200 mg by mouth every 4 hours as needed for fever 9/10/2022       Information source(s): Patient and CareEveryfelicitas/[facility]    Method of interview communication: in-person    Patient was asked about OTC/herbal products specifically.  PTA med list reflects this.    Based on the pharmacist's assessment, the PTA med list information appears reliable    Allergies were reviewed, assessed, and updated with the patient.      Patient does not use any multi-dose medications prior to admission.     Thank you for the opportunity to participate in the care of this patient.      Agapito Washington RPH     9/13/2022     8:59 AM

## 2022-09-13 NOTE — OP NOTE
[facility] Operative Note    Patient Name: Lina Turcios   Patient :  1990  Patient MRN:  [id number]    Procedure date: 2022    Pre-operative diagnosis: Fibroid [D21.9]      Post-operative diagnosis:  The same     Procedure: Procedure(s):  OPEN ABDOMINAL MYOMECTOMY VIA LAPAROTOMY      Surgeon: Barbara Carnett Toppin, MD     Assistants(s):  Jorge dias     Anesthesia: General with Block      Estimated blood loss:  100 cc      Specimens: ID Type Source Tests Collected by Time Destination   1 :  Tissue Fibroid SURGICAL PATHOLOGY EXAM Toppin, Barbara Carnett, MD 2022 11:14 AM          Findings:  Large posterior fibroid     Complications: None.     Condition: Stable       Description of procedure:  Patient was met in the preanesthesia area where the risk benefits and limitations were again discussed with herself and her .  Patient was able to verbalize complete understanding of her surgery and wishes to proceed with the surgery as indicated.  All questions were answered prior to the surgery.    The findings at time of surgery.  The patient was prepped and draped in usual sterile manner in the dorsolithotomy position under general anesthesia.  Pause for the cause was performed and passed.  As well was the briefing.  Once this had been performed the Lomeli catheter was inserted and a Pfannenstiel incision was made through her previous Pfannenstiel and extended down through subcu.  A fascial incision was made and extended laterally in both directions.  The muscle was bluntly dissected and the peritoneum was picked up and entered.  Peritoneal incision was carried superiorly and inferiorly under direct visualization to avoid injury to bowel or bladder.  Once this had been performed the pelvis was explored.  There was a rather normal-appearing uterus tubes and ovaries present and no adhesions anteriorly.  However there was a large 10 cm myoma arising from the posterior  surface of the uterus.  The uterus was delivered along with the myoma through the incision.  And a tourniquet was placed for a total of approximately 6 minutes.  And Pitressin was injected into the pros proposed posterior vertical incision directly over the myoma.  This incision was made and extended down through the myometrial tissue.  And the myoma was then removed by lysing all internal adhesions from the capsule.  Once the myoma had been removed we palpated the uterus for other myomas.  They are extremely tiny less than 0.25 mm myomas 2 or 3 were removed.  And after that the decision was made for closure.  Closure was achieved with 0 Vicryl continuous interrupted sutures for the deep sutures.  The second layer was also continuous interrupted sutures for the second layer.  The tourniquet was then removed to allow return of blood flow and there was excellent return of blood flow and minimal bleeding present.  The third layer was closed with 0 Vicryl continuous suture.  And the fourth layer was closed and closing the serosa with 0 Monocryl continuous imbricating stitch.  Once this had been performed the incision was observed for hemostasis.  In areas where there was small amount of bleeding interrupted figure-of-eight's were placed to control the oozing from the incision.  Once this had been performed and the incision was deemed hemostatic Seprafilm was placed onto the incision.  And the uterus was replaced into the abdominal cavity.  The peritoneum was copiously irrigated.  And once we were happy with hemostasis of the area closure was performed.  The first count was also completed.  Closure of the peritoneum was fairly impossible due to the very thin 10 attenuated tissue.  So closure of the fascia was performed with oh looped PDS continuous suture.  The subcu was irrigated and subcu bleeders were cauterized subcu was closed with 3-0 plain continuous suture.  And the skin was closed with 3-0 Vicryl subcuticular.   A dressing was applied to the incision and patient was taken to the recovery room in good condition without complications.  Estimated blood loss was 100 cc.  I was scrubbed and present for the entire surgery.  Barbara Carnett Toppin, MD

## 2022-09-13 NOTE — ANESTHESIA CARE TRANSFER NOTE
Patient: Lina Turcios    Procedure: Procedure(s):  OPEN ABDOMINAL MYOMECTOMY VIA LAPAROTOMY       Diagnosis: Fibroid [D21.9]  Diagnosis Additional Information: No value filed.    Anesthesia Type:   General     Note:      Level of Consciousness: awake  Oxygen Supplementation: blow-by O2    Independent Airway: airway patency satisfactory and stable        Patient transferred to: PACU    Handoff Report: Identifed the Patient, Identified the Reponsible Provider, Reviewed the pertinent medical history, Discussed the surgical course, Reviewed Intra-OP anesthesia mangement and issues during anesthesia, Set expectations for post-procedure period and Allowed opportunity for questions and acknowledgement of understanding      Vitals:  Vitals Value Taken Time   /66 09/13/22 1350   Temp 36.4  C (97.6  F) 09/13/22 1350   Pulse 76 09/13/22 1355   Resp 14 09/13/22 1355   SpO2 98 % 09/13/22 1355   Vitals shown include unvalidated device data.    Electronically Signed By: Saleem Bhardwaj MD  September 13, 2022  3:00 PM

## 2022-09-13 NOTE — ANESTHESIA PROCEDURE NOTES
Airway       Patient location during procedure: OR  Staff -        Anesthesiologist:  Saleem Bhardwaj MD       CRNA: Jasmyn Cartagena APRN CRNA       Performed By: CRNA  Consent for Airway        Urgency: elective  Indications and Patient Condition       Indications for airway management: senthil-procedural       Induction type:intravenous       Mask difficulty assessment: 1 - vent by mask    Final Airway Details       Final airway type: endotracheal airway       Successful airway: ETT - single  Endotracheal Airway Details        ETT size (mm): 7.0       Cuffed: yes       Successful intubation technique: direct laryngoscopy       DL Blade Type: Le 2       Grade View of Cords: 1       Adjucts: stylet       Position: Right       Measured from: lips       Secured at (cm): 21       Bite block used: None    Post intubation assessment        Placement verified by: capnometry and equal breath sounds        Number of attempts at approach: 1       Number of other approaches attempted: 0       Secured with: silk tape       Ease of procedure: easy       Dentition: Intact and Unchanged

## 2022-09-13 NOTE — ANESTHESIA PROCEDURE NOTES
TAP Procedure Note    Pre-Procedure   Staff -        Anesthesiologist:  Saleem Bhardwaj MD       Performed By: anesthesiologist       Location: pre-op       Procedure Start/Stop Times: 9/13/2022 10:30 AM and 9/13/2022 10:35 AM       Pre-Anesthestic Checklist: patient identified, IV checked, site marked, risks and benefits discussed, informed consent, monitors and equipment checked, pre-op evaluation, at physician/surgeon's request and post-op pain management  Timeout:       Correct Patient: Yes        Correct Procedure: Yes        Correct Site: Yes        Correct Position: Yes        Correct Laterality: Yes        Site Marked: Yes  Procedure Documentation  Procedure: TAP       Laterality: bilateral       Patient Position: supine       Skin prep: Chloraprep       Local skin infiltrated with 3 mL of 1% lidocaine.        Needle Type: other       Needle Gauge: 20.        Needle Length (Inches): 6        Ultrasound guided       1. Ultrasound was used to identify targeted nerve, plexus, vascular marker, or fascial plane and place a needle adjacent to it in real-time.       2. Ultrasound was used to visualize the spread of anesthetic in close proximity to the above referenced structure.       3. A permanent image is entered into the patient's record.       4. The visualized anatomic structures appeared normal.       5. There were no apparent abnormal pathologic findings.    Assessment/Narrative         The placement was negative for: blood aspirated, painful injection and site bleeding       Paresthesias: No.       Test dose of 3 mL at.         Test dose negative, 3 minutes after injection, for signs of intravascular, subdural, or intrathecal injection.       Bolus given via needle. no blood aspirated via catheter.        Secured via.        Insertion/Infusion Method: Single Shot       Complications: none       Injection made incrementally with aspirations every 5 mL.    Medication(s) Administered   Medication  Administration Time: 9/13/2022 10:30 AM

## 2022-09-13 NOTE — PROVIDER NOTIFICATION
Dr. Bhardwaj from anesthesia notified of patients uncontrolled pain.  Ordered post-op  IV pain medications  have been given with no relief.  Order revived from Dr. Bhardwaj for additional IV Dilaudid.  Will give additional pain meds and update as needed.

## 2022-09-13 NOTE — ANESTHESIA PREPROCEDURE EVALUATION
Anesthesia Pre-Procedure Evaluation    Patient: Lina Turcios   MRN: [id number] : 1990        Procedure : Procedure(s):  OPEN ABDOMINAL MYOMECTOMY VIA LAPAROTOMY          Past Medical History:   Diagnosis Date     Depression      Motion sickness      Sleep apnea       Past Surgical History:   Procedure Laterality Date      SECTION        No Known Allergies   Social History     Tobacco Use     Smoking status: Former Smoker     Smokeless tobacco: Current User   Substance Use Topics     Alcohol use: Yes     Comment: a glass or two 3 to 4 nights a week.      Wt Readings from Last 1 Encounters:   22 79.8 kg (176 lb)        Anesthesia Evaluation            ROS/MED HX  ENT/Pulmonary:     (+) sleep apnea, uses CPAP,     Neurologic:  - neg neurologic ROS     Cardiovascular:  - neg cardiovascular ROS     METS/Exercise Tolerance:     Hematologic:  - neg hematologic  ROS     Musculoskeletal:  - neg musculoskeletal ROS     GI/Hepatic:  - neg GI/hepatic ROS     Renal/Genitourinary: Comment: Heavy menses      Endo:     (+) Obesity,     Psychiatric/Substance Use:     (+) psychiatric history anxiety and depression     Infectious Disease:  - neg infectious disease ROS     Malignancy:  - neg malignancy ROS     Other:            Physical Exam    Airway        Mallampati: I   TM distance: > 3 FB      Respiratory Devices and Support         Dental  no notable dental history         Cardiovascular   cardiovascular exam normal       Rhythm and rate: regular and normal     Pulmonary   pulmonary exam normal        breath sounds clear to auscultation           OUTSIDE LABS:  CBC: No results found for: WBC, HGB, HCT, PLT  BMP:   Lab Results   Component Value Date     2022    POTASSIUM 3.8 2022    CHLORIDE 106 2022    CO2 22 2022    BUN 11.2 2022    CR 0.67 2022    GLC 87 2022     COAGS: No results found for: PTT, INR, FIBR  POC: No results found for: BGM, HCG,  HCGS  HEPATIC:   Lab Results   Component Value Date    ALBUMIN 4.2 09/06/2022    PROTTOTAL 6.9 09/06/2022    ALT 13 09/06/2022    AST 22 09/06/2022    ALKPHOS 59 09/06/2022    BILITOTAL 0.4 09/06/2022     OTHER:   Lab Results   Component Value Date    BARRIE 8.9 09/06/2022       Anesthesia Plan    ASA Status:  2      Anesthesia Type: General.   Induction: Intravenous.   Maintenance: Balanced.        Consents    Anesthesia Plan(s) and associated risks, benefits, and realistic alternatives discussed. Questions answered and patient/representative(s) expressed understanding.    - Discussed:     - Discussed with:  Patient         Postoperative Care    Pain management: Peripheral nerve block (Single Shot).   PONV prophylaxis: Ondansetron (or other 5HT-3), Dexamethasone or Solumedrol     Comments:    Other Comments: TAP blocks with exparel after induction            Ernestina Aaron MD

## 2022-09-13 NOTE — ANESTHESIA POSTPROCEDURE EVALUATION
Patient: Lina Turcios    Procedure: Procedure(s):  OPEN ABDOMINAL MYOMECTOMY VIA LAPAROTOMY       Anesthesia Type:  General    Note:     Postop Pain Control: Uneventful            Sign Out: Well controlled pain   PONV: No   Neuro/Psych: Uneventful            Sign Out: Acceptable/Baseline neuro status   Airway/Respiratory: Uneventful            Sign Out: Acceptable/Baseline resp. status   CV/Hemodynamics: Uneventful            Sign Out: Acceptable CV status   Other NRE: NONE   DID A NON-ROUTINE EVENT OCCUR? No           Last vitals:  Vitals Value Taken Time   /66 09/13/22 1350   Temp 36.4  C (97.6  F) 09/13/22 1350   Pulse 76 09/13/22 1355   Resp 14 09/13/22 1355   SpO2 98 % 09/13/22 1355   Vitals shown include unvalidated device data.    Electronically Signed By: Saleem Bhardwaj MD  September 13, 2022  2:58 PM

## 2022-09-14 VITALS
HEIGHT: 60 IN | BODY MASS INDEX: 33 KG/M2 | HEART RATE: 62 BPM | OXYGEN SATURATION: 95 % | SYSTOLIC BLOOD PRESSURE: 141 MMHG | WEIGHT: 168.1 LBS | RESPIRATION RATE: 16 BRPM | TEMPERATURE: 98.2 F | DIASTOLIC BLOOD PRESSURE: 80 MMHG

## 2022-09-14 LAB
FASTING STATUS PATIENT QL REPORTED: YES
GLUCOSE BLD-MCNC: 101 MG/DL (ref 70–125)
HGB BLD-MCNC: 11.1 G/DL (ref 11.7–15.7)
PATH REPORT.COMMENTS IMP SPEC: NORMAL
PATH REPORT.COMMENTS IMP SPEC: NORMAL
PATH REPORT.FINAL DX SPEC: NORMAL
PATH REPORT.GROSS SPEC: NORMAL
PATH REPORT.MICROSCOPIC SPEC OTHER STN: NORMAL
PATH REPORT.RELEVANT HX SPEC: NORMAL
PHOTO IMAGE: NORMAL

## 2022-09-14 PROCEDURE — 250N000013 HC RX MED GY IP 250 OP 250 PS 637: Performed by: OBSTETRICS & GYNECOLOGY

## 2022-09-14 PROCEDURE — 85018 HEMOGLOBIN: CPT | Performed by: OBSTETRICS & GYNECOLOGY

## 2022-09-14 PROCEDURE — 36415 COLL VENOUS BLD VENIPUNCTURE: CPT | Performed by: OBSTETRICS & GYNECOLOGY

## 2022-09-14 PROCEDURE — 82947 ASSAY GLUCOSE BLOOD QUANT: CPT | Performed by: OBSTETRICS & GYNECOLOGY

## 2022-09-14 PROCEDURE — 258N000003 HC RX IP 258 OP 636: Performed by: OBSTETRICS & GYNECOLOGY

## 2022-09-14 RX ORDER — IBUPROFEN 800 MG/1
800 TABLET, FILM COATED ORAL EVERY 6 HOURS
Qty: 30 TABLET | Refills: 0 | Status: SHIPPED | OUTPATIENT
Start: 2022-09-14 | End: 2023-11-08

## 2022-09-14 RX ORDER — OXYCODONE HYDROCHLORIDE 5 MG/1
5 TABLET ORAL EVERY 4 HOURS PRN
Qty: 15 TABLET | Refills: 0 | Status: SHIPPED | OUTPATIENT
Start: 2022-09-14 | End: 2023-01-20

## 2022-09-14 RX ADMIN — SENNOSIDES AND DOCUSATE SODIUM 1 TABLET: 50; 8.6 TABLET ORAL at 09:22

## 2022-09-14 RX ADMIN — IBUPROFEN 800 MG: 800 TABLET ORAL at 03:35

## 2022-09-14 RX ADMIN — IBUPROFEN 800 MG: 800 TABLET ORAL at 09:22

## 2022-09-14 RX ADMIN — ACETAMINOPHEN 975 MG: 325 TABLET, FILM COATED ORAL at 09:22

## 2022-09-14 RX ADMIN — ACETAMINOPHEN 975 MG: 325 TABLET, FILM COATED ORAL at 03:35

## 2022-09-14 RX ADMIN — SODIUM CHLORIDE, POTASSIUM CHLORIDE, SODIUM LACTATE AND CALCIUM CHLORIDE: 600; 310; 30; 20 INJECTION, SOLUTION INTRAVENOUS at 06:26

## 2022-09-14 RX ADMIN — OXYCODONE HYDROCHLORIDE 10 MG: 5 TABLET ORAL at 03:34

## 2022-09-14 ASSESSMENT — ACTIVITIES OF DAILY LIVING (ADL)
ADLS_ACUITY_SCORE: 22

## 2022-09-14 NOTE — PROGRESS NOTES
Called to room cause pt was concerned about vaginal bleeding. Bleeding was scant. Dr. Cain notified and said this is a normal finding after surgery and to keep her updated if it is every heavy.

## 2022-09-14 NOTE — PLAN OF CARE
Problem: Plan of Care - These are the overarching goals to be used throughout the patient stay.    Goal: Plan of Care Review/Shift Note  Description: The Plan of Care Review/Shift note should be completed every shift.  The Outcome Evaluation is a brief statement about your assessment that the patient is improving, declining, or no change.  This information will be displayed automatically on your shift note.  Outcome: Ongoing, Progressing   Goal Outcome Evaluation:             Pt VSS, pain controlled with all ordered medications. Incision dressing CDI. Dressing kept on due to patient request and comfort. Lomeli removed. Pt voided well after removal. Pt ambulated with 2 person assist to bathroom. Returned to bed steady and one person assist. IV fluids running. Tolerating CL diet well.

## 2022-09-14 NOTE — DISCHARGE SUMMARY
HOSPITAL DISCHARGE SUMMARY - Gyn Post Op    Patient Name: Lina Turcios   YOB: 1990  Age: 31 year old  Medical Record Number: [id number]  Primary Physician: Magnus Barrera      Admission Date:  9/13/2022  Discharge Date:  9/14/2022    Principal Diagnosis:    Problem List Items Addressed This Visit    None     Visit Diagnoses     S/P myomectomy    -  Primary    Relevant Medications    ibuprofen (ADVIL/MOTRIN) 800 MG tablet    oxyCODONE (ROXICODONE) 5 MG tablet          Lina Turcios will be discharged from [facility] to home. She feels well and has no concerns. Pain is well controlled with current medications.        REASON FOR ADMISSION: Abdominal myomectomy      PROCEDURES:  Abdominal myomectomy    HISTORY OF PRESENT ILLNESS AND HOSPITAL COURSE: This is a 31 year old female who underwent abdominal myomectomy without complication. Postoperative course was unremarkable.  She was instructed to avoid any heavy lifting and to call if she had any difficulties.     Objective:  BP (!) 141/80   Pulse 62   Temp 98.2  F (36.8  C) (Oral)   Resp 16   Ht 1.524 m (5')   Wt 76.2 kg (168 lb 1.6 oz)   SpO2 95%   BMI 32.83 kg/m    Abd:  Soft, nontender, minimally distended, bowel sounds present.  Incision: intact, dry, no redness present.  Ext: without edema bilaterally    Discharge Medications:   See med list    Discharge Plan:    Follow up with Dr. Aldridge in 2 weeks   Patient Instructions:      Physical activity: ad lara    Diet:  regular    Medication:  See med rec    RTC:  2 weeks  .     Lexi Cain MD

## 2022-09-14 NOTE — PLAN OF CARE
Patient vitally stable. Pain well managed with tylenol/ Ibuprofen. Up independently. Regular diet. Incision dressing CDI.     Discharge education and instructions completed.   Questions encouraged and answered.     Shelby Mcdonough RN

## 2023-01-20 ENCOUNTER — OFFICE VISIT (OUTPATIENT)
Dept: FAMILY MEDICINE | Facility: CLINIC | Age: 33
End: 2023-01-20
Payer: COMMERCIAL

## 2023-01-20 VITALS
OXYGEN SATURATION: 98 % | SYSTOLIC BLOOD PRESSURE: 102 MMHG | HEIGHT: 60 IN | BODY MASS INDEX: 33.23 KG/M2 | WEIGHT: 169.25 LBS | DIASTOLIC BLOOD PRESSURE: 72 MMHG | HEART RATE: 100 BPM

## 2023-01-20 DIAGNOSIS — J02.9 SORE THROAT: Primary | ICD-10-CM

## 2023-01-20 DIAGNOSIS — Z98.890 STATUS POST HYSTEROSCOPIC MYOMECTOMY: ICD-10-CM

## 2023-01-20 DIAGNOSIS — J02.0 STREPTOCOCCAL PHARYNGITIS: ICD-10-CM

## 2023-01-20 DIAGNOSIS — H65.04 RECURRENT ACUTE SEROUS OTITIS MEDIA OF RIGHT EAR: ICD-10-CM

## 2023-01-20 PROBLEM — O34.10 UTERINE FIBROIDS AFFECTING PREGNANCY: Status: RESOLVED | Noted: 2022-09-13 | Resolved: 2023-01-20

## 2023-01-20 PROBLEM — D25.9 UTERINE FIBROIDS AFFECTING PREGNANCY: Status: RESOLVED | Noted: 2022-09-13 | Resolved: 2023-01-20

## 2023-01-20 LAB — DEPRECATED S PYO AG THROAT QL EIA: POSITIVE

## 2023-01-20 PROCEDURE — 96372 THER/PROPH/DIAG INJ SC/IM: CPT | Performed by: FAMILY MEDICINE

## 2023-01-20 PROCEDURE — 87880 STREP A ASSAY W/OPTIC: CPT | Performed by: FAMILY MEDICINE

## 2023-01-20 PROCEDURE — 99213 OFFICE O/P EST LOW 20 MIN: CPT | Mod: 25 | Performed by: FAMILY MEDICINE

## 2023-01-20 RX ORDER — BENZOCAINE/MENTH/CETYLPYRD CL 15 MG-2 MG
LOZENGE MUCOUS MEMBRANE
Qty: 18 LOZENGE | Refills: 1 | Status: SHIPPED | OUTPATIENT
Start: 2023-01-20 | End: 2023-11-08

## 2023-01-20 RX ORDER — PENICILLIN V POTASSIUM 500 MG/1
TABLET, FILM COATED ORAL
COMMUNITY
Start: 2022-12-31 | End: 2023-01-20

## 2023-01-20 NOTE — PROGRESS NOTES
Assessment & Plan     Patient presents with:  Pharyngitis: Was treated for strep 12/31 by [facility], given Penicillin. Missed a few days of the medication, took the remainder of it earlier this week. Still feeling sore throat. Wondering if her IUD is still effective with use of antibiotics.      Lina was seen today for pharyngitis.    Diagnoses and all orders for this visit:    Sore throat  -     Streptococcus A Rapid Screen w/Reflex to PCR - Clinic Collect  -     benzocaine-menthol (CEPACOL) 15-3.6 MG lozenge 1 lozenge  Comment: Patient is a . Advised patient to wear mask at work.   Change toothbrush daily to prevent bacteria infection sticking around. Recommended using Listerine mouth wash.  Status post hysteroscopic myomectomy  Comments:  Doing well after myomectomy, but still having heavy menstrual bleeding. Has IUD in place.    Recurrent acute serous otitis media of right ear  -     loratadine-pseudoePHEDrine (CLARITIN-D 12-HOUR) 5-120 MG 12 hr tablet; Take 1 tablet by mouth 2 times daily    Streptococcal pharyngitis  -     penicillin G benzathine (BICILLIN L-A) injection 1.2 Million Units              BMI:   Estimated body mass index is 33.05 kg/m  as calculated from the following:    Height as of this encounter: 1.524 m (5').    Weight as of this encounter: 76.8 kg (169 lb 4 oz).       No follow-ups on file.      Subjective   Lina Turcios 32 year old who presents for the following health issues     HPI   .  Acute Illness  Acute illness concerns: Strep throat  Onset/Duration: 12/31/22, went away and sore throat came back earlier this week.   Symptoms:  Fever: No  Chills/Sweats: No  Headache (location?): YES, mild  Sinus Pressure: YES  Conjunctivitis:  No  Ear Pain: no  Rhinorrhea: No  Congestion: YES, post nasal drip  Sore Throat: YES  Cough: no  Wheeze: No  Decreased Appetite: YES  Nausea: No  Vomiting: No  Diarrhea: YES  Dysuria/Freq.: No  Dysuria or Hematuria: No  Fatigue/Achiness:  YES  Sick/Strep Exposure: YES- had strep 12/31  Therapies tried and outcome: Penicillin       Patient Active Problem List   Diagnosis     Anxiety Disorder NOS     Abdominal Pain Around The Belly Button (Periumbilical)     Fatigue     Vitamin D deficiency     Type 3b perineal laceration during delivery     Status post hysteroscopic myomectomy        Current Outpatient Medications   Medication     acetaminophen (TYLENOL) 500 MG tablet     Benzocaine-Menthol (CEPACOL SORE THROAT) 10-2.1 MG LOZG     ibuprofen (ADVIL/MOTRIN) 200 MG capsule     ibuprofen (ADVIL/MOTRIN) 800 MG tablet     loratadine-pseudoePHEDrine (CLARITIN-D 12-HOUR) 5-120 MG 12 hr tablet     No current facility-administered medications for this visit.          Social Determinants of Health     Tobacco Use: Medium Risk     Smoking Tobacco Use: Former     Smokeless Tobacco Use: Never     Passive Exposure: Not on file   Alcohol Use: Not on file   Financial Resource Strain: Not on file   Food Insecurity: Not on file   Transportation Needs: Not on file   Physical Activity: Not on file   Stress: Not on file   Social Connections: Not on file   Intimate Partner Violence: Not on file   Depression: Not at risk     PHQ-2 Score: 0   Housing Stability: Not on file        Review of Systems   Constitutional, HEENT, cardiovascular, pulmonary, GI, , musculoskeletal, neuro, skin, endocrine and psych systems are negative, except as otherwise noted.      Objective    /72 (BP Location: Left arm, Patient Position: Sitting, Cuff Size: Adult Regular)   Pulse 100   Ht 1.524 m (5')   Wt 76.8 kg (169 lb 4 oz)   SpO2 98%   BMI 33.05 kg/m     LMP 06/01/2011   There is no height or weight on file to calculate BMI.  Physical Exam   GENERAL: healthy, alert and no distress  HENT: ear canals and TM's normal, nose and mouth without ulcers or lesions  NECK: no adenopathy, no asymmetry, masses, or scars and thyroid normal to palpation  RESP: lungs clear to auscultation - no  rales, rhonchi or wheezes  CV: regular rate and rhythm, normal S1 S2, no S3 or S4, no murmur, click or rub, no peripheral edema and peripheral pulses strong    Dot Love MD   [facility].  [phone]   Answers for HPI/ROS submitted by the patient on 1/20/2023  How many servings of fruits and vegetables do you eat daily?: 2-3  On average, how many sweetened beverages do you drink each day (Examples: soda, juice, sweet tea, etc.  Do NOT count diet or artificially sweetened beverages)?: 2  How many minutes a day do you exercise enough to make your heart beat faster?: 30 to 60  How many days a week do you exercise enough to make your heart beat faster?: 4  How many days per week do you miss taking your medication?: 0  What is the reason for your visit today?: strep symptoms  When did your symptoms begin?: 1-3 days ago

## 2023-01-24 DIAGNOSIS — H65.04 RECURRENT ACUTE SEROUS OTITIS MEDIA OF RIGHT EAR: ICD-10-CM

## 2023-01-24 NOTE — TELEPHONE ENCOUNTER
Pt called in for med, Dr Love sent Claritin-D, but she wrote to dispense only 1 tablet. Pharmacy is asking to send other Rx with the write amount to dispense

## 2023-01-25 NOTE — TELEPHONE ENCOUNTER
Former patient of Holly & has not established care with another provider.  Please assign refill request to covering provider per clinic standard process.    Routing refill request to provider for review/approval because:  Controlled substance request  No PCP    Last Written Prescription Date:  1/20/23  Last Fill Quantity: 1,  # refills: 0   Last office visit provider:  1/20/23     Requested Prescriptions   Pending Prescriptions Disp Refills     loratadine-pseudoePHEDrine (CLARITIN-D 12-HOUR) 5-120 MG 12 hr tablet 60 tablet 3     Sig: Take 1 tablet by mouth 2 times daily       There is no refill protocol information for this order          Jalen Ness RN 01/25/23 11:19 AM

## 2023-04-30 ENCOUNTER — HEALTH MAINTENANCE LETTER (OUTPATIENT)
Age: 33
End: 2023-04-30

## 2023-05-16 ENCOUNTER — TRANSCRIBE ORDERS (OUTPATIENT)
Dept: OTHER | Age: 33
End: 2023-05-16

## 2023-05-16 DIAGNOSIS — R79.89 ABNORMAL TSH: Primary | ICD-10-CM

## 2023-11-08 ENCOUNTER — OFFICE VISIT (OUTPATIENT)
Dept: ENDOCRINOLOGY | Facility: CLINIC | Age: 33
End: 2023-11-08

## 2023-11-08 VITALS
BODY MASS INDEX: 35.46 KG/M2 | HEART RATE: 78 BPM | HEIGHT: 60 IN | WEIGHT: 180.6 LBS | DIASTOLIC BLOOD PRESSURE: 75 MMHG | TEMPERATURE: 99.3 F | OXYGEN SATURATION: 98 % | SYSTOLIC BLOOD PRESSURE: 108 MMHG | RESPIRATION RATE: 16 BRPM

## 2023-11-08 DIAGNOSIS — R79.89 ABNORMAL TSH: ICD-10-CM

## 2023-11-08 LAB
T3FREE SERPL-MCNC: 3.9 PG/ML (ref 2–4.4)
T4 FREE SERPL-MCNC: 1.01 NG/DL (ref 0.9–1.7)
TSH SERPL DL<=0.005 MIU/L-ACNC: 0.29 UIU/ML (ref 0.3–4.2)

## 2023-11-08 PROCEDURE — 84445 ASSAY OF TSI GLOBULIN: CPT | Mod: 90 | Performed by: INTERNAL MEDICINE

## 2023-11-08 PROCEDURE — 99000 SPECIMEN HANDLING OFFICE-LAB: CPT | Performed by: INTERNAL MEDICINE

## 2023-11-08 PROCEDURE — 84481 FREE ASSAY (FT-3): CPT | Performed by: INTERNAL MEDICINE

## 2023-11-08 PROCEDURE — 84439 ASSAY OF FREE THYROXINE: CPT | Performed by: INTERNAL MEDICINE

## 2023-11-08 PROCEDURE — 99203 OFFICE O/P NEW LOW 30 MIN: CPT | Performed by: INTERNAL MEDICINE

## 2023-11-08 PROCEDURE — 36415 COLL VENOUS BLD VENIPUNCTURE: CPT | Performed by: INTERNAL MEDICINE

## 2023-11-08 PROCEDURE — 84443 ASSAY THYROID STIM HORMONE: CPT | Performed by: INTERNAL MEDICINE

## 2023-11-08 NOTE — PROGRESS NOTES
Name: Lina Turcios  Seen in consultation with Harper Almaraz MD  for subclinical Hyperthyroidism.    HPI:  Lina Turcios is a 33 year old female who presents for the evaluation of Hyperthyroidism.  2023- referral was placed but no labs or progress notes to review. Per pt reports TSH was low. TSH 0.31, FT4 0.78  2023 labs at [facility] TSH 0.32. No other TFTs were done. She was in ER for palpitation at that time.    + tired.    History of radiation exposure: NO  History of thyroid dysfunction: NO  Palpitations:  Yes: palpitations on and off. Not on beta blocker.  Changes to hair or skin: acne  Diarrhea/Constipation:No  Changes in menses: No. Not planning pregnancy. Has 3 kids. Has IUD  Changes in vision:No  Diplopia/Blurryness:No  Dysphagia or Shortness of breath:No  Muscle aches or pain:achy- no change  Tremors:Yes: on and off X few months  Difficulty sleeping:No  Changes in weight: No  Lithium/Amiodarone: No  URI: No  CT Scans:No  Wt Readings from Last 2 Encounters:   23 81.9 kg (180 lb 9.6 oz)   23 76.8 kg (169 lb 4 oz)     PMH/PSH:  Past Medical History:   Diagnosis Date    Depression     Motion sickness     Sleep apnea      Past Surgical History:   Procedure Laterality Date     SECTION      MYOMECTOMY N/A 2022    Procedure: OPEN ABDOMINAL MYOMECTOMY VIA LAPAROTOMY;  Surgeon: Toppin, Barbara Carnett, MD;  Location: [facility] Main OR     Family Hx:  Family History   Adopted: Yes   Problem Relation Age of Onset    No Known Problems Mother     No Known Problems Father      Thyroid disease:  she is adopted.            Social Hx:  Social History     Socioeconomic History    Marital status:      Spouse name: Not on file    Number of children: Not on file    Years of education: Not on file    Highest education level: Not on file   Occupational History    Not on file   Tobacco Use    Smoking status: Former    Smokeless tobacco: Never   Vaping Use    Vaping Use: Every day  "  Substance and Sexual Activity    Alcohol use: Yes     Comment: a glass or two 3 to 4 nights a week.    Drug use: Never    Sexual activity: Not on file   Other Topics Concern    Parent/sibling w/ CABG, MI or angioplasty before 65F 55M? Not Asked   Social History Narrative    Not on file     Social Determinants of Health     Financial Resource Strain: Not on file   Food Insecurity: Not on file   Transportation Needs: Not on file   Physical Activity: Not on file   Stress: Not on file   Social Connections: Not on file   Interpersonal Safety: Not on file   Housing Stability: Not on file          MEDICATIONS:  currently has no medications in their medication list.    ROS   ROS: 10 point ROS neg other than the symptoms noted above in the HPI.    Physical Exam   VS: There were no vitals taken for this visit.  GENERAL: healthy, alert and no distress  EYES: Eyes grossly normal to inspection, conjunctivae and sclerae normal  ENT: no nose swelling, nasal discharge.  Thyroid: no apparent thyroid nodules.  Thyroid appears normal in size and nontender.  CV: RRR, no rubs, gallops, no murmurs  RESP: CTAB, no wheezes, rales, or ronchi  ABDO: +BS  EXTREMITIES: no hand tremors.  NEURO: Cranial nerves grossly intact, mentation intact and speech normal  SKIN: No apparent skin lesions, rash or edema seen   PSYCH: mentation appears normal, affect normal/bright, judgement and insight intact, normal speech and appearance well-groomed    LABS:  TFTs:  No results found for: \"TSH\"]      CBC:  No results found for: \"WBC\"    LFTs:  Liver Function Studies -   Recent Labs   Lab Test 09/06/22  0937   PROTTOTAL 6.9   ALBUMIN 4.2   BILITOTAL 0.4   ALKPHOS 59   AST 22   ALT 13         All pertinent notes, labs, and images personally reviewed by me.     A/P  Ms.Allison ALLEN Turcios is a 33 year old here for the evaluation of hyperthyroidism.    Subclinical hyperthyroidism:  Differential for hyperthyroidism includes:  Graves' disease, thyroiditis, or " autonomous hyperfunctioning nodule.  An uptake and scan of her thyroid gland will help differentiate the diagnosis.  In Graves' disease her uptake will be homogeneous and increased, in thyroiditis her uptake will be low, and in an autonomous hyperfunctioing nodule the uptake will be increased in a focal area.    Labs from 5/2023 and 9/2023 showed normal TSH and normal free T4 consistent with subclinical hyperthyroidism.  Heart rate is normal.  She is not on beta-blocker.  Clinically she is complaining of fatigue.  Plan:  Discussed subclinical hyperthyroidism and differential diagnosis for hyperthyroidism.  Discussed diagnosis, pathophysiology, management and treatment options of condition with pt.  We will hold off on beta-blocker at this time as her heart rate is in the normal range.  Plan to get labs as noted below and screen for Graves' disease  Based on that consider further work-up including possible thyroid uptake and scan.  Plan: T3 Free, T4 free, TSH, Thyroid stimulating         immunoglobulin          Thyroid-stimulating immunoglobulins (TSI) can be detected in the majority of patients (77.8%) with Graves  disease.  It can be used to predict relapse or remission when using PTU/methimazole or radioiodine.  These assays have also been advocated for use in patients with subclinical Graves  hyperthyroidism or patients with unilateral ophthalmopathy.    Thyrotoxicosis associated with subacute thyroiditis is usually mild and transient.  The   patient lacks the physical findings of long-standing thyrotoxicosis. The goiter is typically  painful and low or absent 131I uptake.  Usually the erythrocyte sedimentation rate (ESR) and CRP are greatly elevated, and the leukocyte count may also be increased. Antibody titers are low or negative.    Today we will repeat her thyroid function (TSH, freeT4, T3 total, TPO, TSI), CBC, ESR, CRP and obtain an uptake and scan.  Based on these results further treatment will be  discussed.       Treatment of Graves' hyperthyroidism consists of amelioration of symptoms with a beta-blocker and measures aimed at decreasing thyroid hormone synthesis: the administration of a thionamide, radioiodine ablation, or surgery.  Thionamides -- Thionamides (methimazole and propylthiouracial (PTU)).  Methimazole is preferred because of its longer duration of action (once daily dosing) and has lower incidence of side effects.   PTU is preferred during pregnancy because of the potential teratogenic effects of methimazole.   The goal of therapy in Graves' hyperthyroidism is to be euthyroid within three to eight weeks. This can be followed by ablative therapy with radioiodine or surgery or by continuation of the drug for a prolonged period (usually one to two years) with the hope of attaining a permanent remission. If long-term medical therapy is chosen, the dose of methimazole is then tapered to a maintenance dose with the goal of maintaining a euthyroid state.  Both MMI and PTU can cause pruritus, rash, urticaria, arthralgias, arthritis, fever, abnormal taste sensation, nausea, or vomiting in up to 13 percent of patients.  If one drug is not tolerated, the other drug can be substituted, but up to 50 percent of patients have cross-sensitivity. The gastrointestinal side effects are dose-dependent. Thus, patients taking higher doses of MMI should be started on divided doses.  Agranulocytosis is a rare but serious complication of thionamide therapy, with a prevalence of 0.2 to 0.5 percent, and usually occurs within the first two months of treatment. The risk of agranulocytosis is higher for antithyroid drugs than for 20 other classes of drugs associated with this rare complication.  Hepatotoxicity is a rare complication of thionamide therapy. Serum aminotransferase concentrations increase transiently in up to one-third of patients taking PTU.    Radioiodine ablation -- Radioiodine is widely used for the  treatment of Graves' hyperthyroidism. It is the therapy of choice in the [place].  Radioiodine therapy may be associated with an increased risk of the development or worsening of Graves' ophthalmopathy.  Radioiodine is administered as a capsule and induces extensive tissue damage, resulting in ablation of the thyroid within 6 to 18 weeks.   Approximately 20 percent of patients fail the first radioiodine treatment and require a second or subsequent dose. These patients usually have more severe hyperthyroidism or larger goiters.    Follow-up:  As noted in AVS    Joy Baird MD  Endocrinology  [facility]  CC: Magnus Barrera       All questions were answered.  The patient indicates understanding of the above issues and agrees with the plan set forth.

## 2023-11-08 NOTE — LETTER
2023         RE: Lina Turcios  [address]  [facility] [address]        Dear Colleague,    Thank you for referring your patient, Lina Turcios, to the [facility]. Please see a copy of my visit note below.    Name: Lina Turcios  Seen in consultation with Harper Almaraz MD  for subclinical Hyperthyroidism.    HPI:  Lina Turcios is a 33 year old female who presents for the evaluation of Hyperthyroidism.  2023- referral was placed but no labs or progress notes to review. Per pt reports TSH was low. TSH 0.31, FT4 0.78  2023 labs at [facility] TSH 0.32. No other TFTs were done. She was in ER for palpitation at that time.    + tired.    History of radiation exposure: NO  History of thyroid dysfunction: NO  Palpitations:  Yes: palpitations on and off. Not on beta blocker.  Changes to hair or skin: acne  Diarrhea/Constipation:No  Changes in menses: No. Not planning pregnancy. Has 3 kids. Has IUD  Changes in vision:No  Diplopia/Blurryness:No  Dysphagia or Shortness of breath:No  Muscle aches or pain:achy- no change  Tremors:Yes: on and off X few months  Difficulty sleeping:No  Changes in weight: No  Lithium/Amiodarone: No  URI: No  CT Scans:No  Wt Readings from Last 2 Encounters:   23 81.9 kg (180 lb 9.6 oz)   23 76.8 kg (169 lb 4 oz)     PMH/PSH:  Past Medical History:   Diagnosis Date     Depression      Motion sickness      Sleep apnea      Past Surgical History:   Procedure Laterality Date      SECTION       MYOMECTOMY N/A 2022    Procedure: OPEN ABDOMINAL MYOMECTOMY VIA LAPAROTOMY;  Surgeon: Toppin, Barbara Carnett, MD;  Location: [facility] Main OR     Family Hx:  Family History   Adopted: Yes   Problem Relation Age of Onset     No Known Problems Mother      No Known Problems Father      Thyroid disease:  she is adopted.            Social Hx:  Social History     Socioeconomic History     Marital status:      Spouse name: Not on file  "    Number of children: Not on file     Years of education: Not on file     Highest education level: Not on file   Occupational History     Not on file   Tobacco Use     Smoking status: Former     Smokeless tobacco: Never   Vaping Use     Vaping Use: Every day   Substance and Sexual Activity     Alcohol use: Yes     Comment: a glass or two 3 to 4 nights a week.     Drug use: Never     Sexual activity: Not on file   Other Topics Concern     Parent/sibling w/ CABG, MI or angioplasty before 65F 55M? Not Asked   Social History Narrative     Not on file     Social Determinants of Health     Financial Resource Strain: Not on file   Food Insecurity: Not on file   Transportation Needs: Not on file   Physical Activity: Not on file   Stress: Not on file   Social Connections: Not on file   Interpersonal Safety: Not on file   Housing Stability: Not on file          MEDICATIONS:  currently has no medications in their medication list.    ROS   ROS: 10 point ROS neg other than the symptoms noted above in the HPI.    Physical Exam   VS: There were no vitals taken for this visit.  GENERAL: healthy, alert and no distress  EYES: Eyes grossly normal to inspection, conjunctivae and sclerae normal  ENT: no nose swelling, nasal discharge.  Thyroid: no apparent thyroid nodules.  Thyroid appears normal in size and nontender.  CV: RRR, no rubs, gallops, no murmurs  RESP: CTAB, no wheezes, rales, or ronchi  ABDO: +BS  EXTREMITIES: no hand tremors.  NEURO: Cranial nerves grossly intact, mentation intact and speech normal  SKIN: No apparent skin lesions, rash or edema seen   PSYCH: mentation appears normal, affect normal/bright, judgement and insight intact, normal speech and appearance well-groomed    LABS:  TFTs:  No results found for: \"TSH\"]      CBC:  No results found for: \"WBC\"    LFTs:  Liver Function Studies -   Recent Labs   Lab Test 09/06/22  0937   PROTTOTAL 6.9   ALBUMIN 4.2   BILITOTAL 0.4   ALKPHOS 59   AST 22   ALT 13         All " pertinent notes, labs, and images personally reviewed by me.     A/P  Ms.Allison ALLEN Turcios is a 33 year old here for the evaluation of hyperthyroidism.    Subclinical hyperthyroidism:  Differential for hyperthyroidism includes:  Graves' disease, thyroiditis, or autonomous hyperfunctioning nodule.  An uptake and scan of her thyroid gland will help differentiate the diagnosis.  In Graves' disease her uptake will be homogeneous and increased, in thyroiditis her uptake will be low, and in an autonomous hyperfunctioing nodule the uptake will be increased in a focal area.    Labs from 5/2023 and 9/2023 showed normal TSH and normal free T4 consistent with subclinical hyperthyroidism.  Heart rate is normal.  She is not on beta-blocker.  Clinically she is complaining of fatigue.  Plan:  Discussed subclinical hyperthyroidism and differential diagnosis for hyperthyroidism.  Discussed diagnosis, pathophysiology, management and treatment options of condition with pt.  We will hold off on beta-blocker at this time as her heart rate is in the normal range.  Plan to get labs as noted below and screen for Graves' disease  Based on that consider further work-up including possible thyroid uptake and scan.  Plan: T3 Free, T4 free, TSH, Thyroid stimulating         immunoglobulin          Thyroid-stimulating immunoglobulins (TSI) can be detected in the majority of patients (77.8%) with Graves  disease.  It can be used to predict relapse or remission when using PTU/methimazole or radioiodine.  These assays have also been advocated for use in patients with subclinical Graves  hyperthyroidism or patients with unilateral ophthalmopathy.    Thyrotoxicosis associated with subacute thyroiditis is usually mild and transient.  The   patient lacks the physical findings of long-standing thyrotoxicosis. The goiter is typically  painful and low or absent 131I uptake.  Usually the erythrocyte sedimentation rate (ESR) and CRP are greatly elevated, and  the leukocyte count may also be increased. Antibody titers are low or negative.    Today we will repeat her thyroid function (TSH, freeT4, T3 total, TPO, TSI), CBC, ESR, CRP and obtain an uptake and scan.  Based on these results further treatment will be discussed.       Treatment of Graves' hyperthyroidism consists of amelioration of symptoms with a beta-blocker and measures aimed at decreasing thyroid hormone synthesis: the administration of a thionamide, radioiodine ablation, or surgery.  Thionamides -- Thionamides (methimazole and propylthiouracial (PTU)).  Methimazole is preferred because of its longer duration of action (once daily dosing) and has lower incidence of side effects.   PTU is preferred during pregnancy because of the potential teratogenic effects of methimazole.   The goal of therapy in Graves' hyperthyroidism is to be euthyroid within three to eight weeks. This can be followed by ablative therapy with radioiodine or surgery or by continuation of the drug for a prolonged period (usually one to two years) with the hope of attaining a permanent remission. If long-term medical therapy is chosen, the dose of methimazole is then tapered to a maintenance dose with the goal of maintaining a euthyroid state.  Both MMI and PTU can cause pruritus, rash, urticaria, arthralgias, arthritis, fever, abnormal taste sensation, nausea, or vomiting in up to 13 percent of patients.  If one drug is not tolerated, the other drug can be substituted, but up to 50 percent of patients have cross-sensitivity. The gastrointestinal side effects are dose-dependent. Thus, patients taking higher doses of MMI should be started on divided doses.  Agranulocytosis is a rare but serious complication of thionamide therapy, with a prevalence of 0.2 to 0.5 percent, and usually occurs within the first two months of treatment. The risk of agranulocytosis is higher for antithyroid drugs than for 20 other classes of drugs associated with  this rare complication.  Hepatotoxicity is a rare complication of thionamide therapy. Serum aminotransferase concentrations increase transiently in up to one-third of patients taking PTU.    Radioiodine ablation -- Radioiodine is widely used for the treatment of Graves' hyperthyroidism. It is the therapy of choice in the [place].  Radioiodine therapy may be associated with an increased risk of the development or worsening of Graves' ophthalmopathy.  Radioiodine is administered as a capsule and induces extensive tissue damage, resulting in ablation of the thyroid within 6 to 18 weeks.   Approximately 20 percent of patients fail the first radioiodine treatment and require a second or subsequent dose. These patients usually have more severe hyperthyroidism or larger goiters.    Follow-up:  As noted in AVS    Joy Baird MD  Endocrinology  [facility]  CC: Magnus Barrera       All questions were answered.  The patient indicates understanding of the above issues and agrees with the plan set forth.         Again, thank you for allowing me to participate in the care of your patient.        Sincerely,        Joy Baird MD

## 2023-11-08 NOTE — PATIENT INSTRUCTIONS
[facility]  Dr Baird, Endocrinology Department    [place] [address] [id number]  [place] [address]  Appointment [phone]  Fax: [phone]  [place]: Monday - Thursday      Please check the cost coverage and copay with insurance before recommended tests, services and medications (especially if new medications are prescribed).     If ordered, please get blood work done 1 week prior to your next appointment so they will be available to Dr. Baird at your visit.    Labs today  Further work up based on that

## 2023-11-13 LAB — TSI SER-ACNC: <1 TSI INDEX

## 2023-12-12 ENCOUNTER — TELEPHONE (OUTPATIENT)
Dept: ENDOCRINOLOGY | Facility: CLINIC | Age: 33
End: 2023-12-12

## 2023-12-12 DIAGNOSIS — R79.89 ABNORMAL TSH: Primary | ICD-10-CM

## 2023-12-13 NOTE — TELEPHONE ENCOUNTER
Latest Ref Rng 11/8/2023  3:16 PM   ENDO THYROID LABS-[facility]     TSH 0.30 - 4.20 uIU/mL 0.29 (L)    Free T3 2.0 - 4.4 pg/mL 3.9    FREE T4 0.90 - 1.70 ng/dL 1.01    Thyroid Stim Immunog <=1.3 TSI index <1.0       Labs c/w subclinical hyperthyroidism.  TSI neg that means it is not Graves disease.  As TSH is close to normal range and improved from previous labs with other labs t4 and t3 in range, no mediation is needed at this time.  Recommend recheck in 4-6 weeks to follow trend  Please place tsh, ft4, ft3 labs.

## 2023-12-19 NOTE — TELEPHONE ENCOUNTER
Spoke to pt and informed of below. Pt verbalized understanding. Pt will call back to schedule lab appt as pt does not have insurance as of now.

## 2024-07-07 ENCOUNTER — HEALTH MAINTENANCE LETTER (OUTPATIENT)
Age: 34
End: 2024-07-07

## 2025-07-13 ENCOUNTER — HEALTH MAINTENANCE LETTER (OUTPATIENT)
Age: 35
End: 2025-07-13

## (undated) DEVICE — GLOVE SURG PI ULTRA TOUCH M 5-1/2 LF

## (undated) DEVICE — SOL NACL 0.9% IRRIG 1000ML BOTTLE 2F7124

## (undated) DEVICE — PLATE GROUNDING ADULT W/CORD 9165L

## (undated) DEVICE — SU VICRYL+ 0 8-18 CT1/CR UND VCP840D

## (undated) DEVICE — DRESSING MEPILEX BORDER POST-OP 4X12

## (undated) DEVICE — CATH TRAY FOLEY SURESTEP 16FR DRAIN BAG STATOCK A899916

## (undated) DEVICE — DRSG STERI STRIP 1/2X4" R1547

## (undated) DEVICE — ADH SKIN CLOSURE PREMIERPRO EXOFIN 1.0ML 3470

## (undated) DEVICE — SUTURE PDS 0 60IN CTX+ LOOPED PDP990G

## (undated) DEVICE — NEEDLE HYPO 18X1-1/2 SAFETY 305918

## (undated) DEVICE — DRESSING MEPILEX AG SILVER 4X12 395990

## (undated) DEVICE — GLOVE BIOGEL PI ULTRATOUCH G SZ 7.5 42175

## (undated) DEVICE — SYR 10ML PREFILLED 0.9% NACL INJ NOT STERILE 306547

## (undated) DEVICE — GOWN LG DISP 9515

## (undated) DEVICE — PREP CHLORAPREP 26ML TINTED HI-LITE ORANGE 930815

## (undated) DEVICE — NEEDLE HYPO 21GA X 1-1/2 SAFETY 305917

## (undated) DEVICE — PACK MINOR SINGLE BASIN SSK3001

## (undated) DEVICE — TUBE PENROSE 1/4 X 12 STRL 30414-025

## (undated) DEVICE — ESU PENCIL SMOKE EVAC W/ROCKER SWITCH 0703-047-000

## (undated) DEVICE — SUCTION TIP YANKAUER W/O VENT K86

## (undated) DEVICE — ESU ELEC BLADE 6" COATED E1450-6

## (undated) DEVICE — SU PLAIN 3-0 XLH  27" 52T

## (undated) DEVICE — SU VICRYL+ 3-0 KS UNDYED VCP663H

## (undated) DEVICE — MITT PRE-OP 7 L X 5 1/2 W 5177M1

## (undated) DEVICE — SPONGE LAP 18X18" X8435

## (undated) DEVICE — DRAPE IOBAN 2 C-SECTION 3M 6697

## (undated) DEVICE — BARRIER SEPRAFILM 5X6" SINGLE SHEET 4301-02

## (undated) DEVICE — SUTURE VICRYL+ 0 27IN CT-1 UND VCP260H

## (undated) DEVICE — CUSTOM PACK GEN MAJOR SBA5BGMHEA

## (undated) DEVICE — SUTURE VICRYL+ 2-0 36IN CT-1 UND VCP945H

## (undated) DEVICE — SYR 10ML FINGER CONTROL W/O NDL 309695

## (undated) DEVICE — SUTURE MONOCRYL+ 0 CT-1 UND 18 MCP946H

## (undated) DEVICE — SUCTION MANIFOLD NEPTUNE 2 SYS 1 PORT 702-025-000

## (undated) DEVICE — SOL WATER IRRIG 1000ML BOTTLE 2F7114

## (undated) DEVICE — GOWN IMPERVIOUS BREATHABLE 2XL/XLONG

## (undated) DEVICE — SUTURE VICRYL+ 0 36IN CT-1 UND VCP946H

## (undated) DEVICE — DRAPE STERI CESAREAN W/POUCH 7966

## (undated) RX ORDER — FENTANYL CITRATE-0.9 % NACL/PF 10 MCG/ML
PLASTIC BAG, INJECTION (ML) INTRAVENOUS
Status: DISPENSED
Start: 2022-09-13

## (undated) RX ORDER — DEXAMETHASONE SODIUM PHOSPHATE 10 MG/ML
INJECTION, EMULSION INTRAMUSCULAR; INTRAVENOUS
Status: DISPENSED
Start: 2022-09-13

## (undated) RX ORDER — PROPOFOL 10 MG/ML
INJECTION, EMULSION INTRAVENOUS
Status: DISPENSED
Start: 2022-09-13

## (undated) RX ORDER — FENTANYL CITRATE 50 UG/ML
INJECTION, SOLUTION INTRAMUSCULAR; INTRAVENOUS
Status: DISPENSED
Start: 2022-09-13

## (undated) RX ORDER — ONDANSETRON 2 MG/ML
INJECTION INTRAMUSCULAR; INTRAVENOUS
Status: DISPENSED
Start: 2022-09-13

## (undated) RX ORDER — DEXAMETHASONE SODIUM PHOSPHATE 4 MG/ML
INJECTION, SOLUTION INTRA-ARTICULAR; INTRALESIONAL; INTRAMUSCULAR; INTRAVENOUS; SOFT TISSUE
Status: DISPENSED
Start: 2022-09-13